# Patient Record
Sex: MALE | Race: WHITE | Employment: OTHER | URBAN - METROPOLITAN AREA
[De-identification: names, ages, dates, MRNs, and addresses within clinical notes are randomized per-mention and may not be internally consistent; named-entity substitution may affect disease eponyms.]

---

## 2017-03-10 ENCOUNTER — HOSPITAL ENCOUNTER (OUTPATIENT)
Dept: ENDOSCOPY | Age: 62
Discharge: HOME | End: 2017-03-10
Attending: INTERNAL MEDICINE | Admitting: INTERNAL MEDICINE

## 2017-03-10 VITALS
TEMPERATURE: 97.9 F | HEART RATE: 70 BPM | OXYGEN SATURATION: 100 % | HEIGHT: 73 IN | WEIGHT: 182 LBS | BODY MASS INDEX: 24.12 KG/M2 | RESPIRATION RATE: 20 BRPM

## 2017-03-10 LAB
GLUCOSE BLD-MCNC: 106 MG/DL (ref 70–99)
PERFORMED ON: ABNORMAL

## 2017-04-04 ENCOUNTER — HOSPITAL ENCOUNTER (OUTPATIENT)
Dept: ENDOSCOPY | Age: 62
Discharge: HOME | End: 2017-04-04
Attending: INTERNAL MEDICINE | Admitting: INTERNAL MEDICINE

## 2017-04-04 VITALS
HEIGHT: 73 IN | WEIGHT: 182 LBS | RESPIRATION RATE: 16 BRPM | OXYGEN SATURATION: 96 % | DIASTOLIC BLOOD PRESSURE: 71 MMHG | BODY MASS INDEX: 24.12 KG/M2 | SYSTOLIC BLOOD PRESSURE: 117 MMHG | HEART RATE: 68 BPM | TEMPERATURE: 97.7 F

## 2017-04-04 LAB
GLUCOSE BLD-MCNC: 113 MG/DL (ref 70–99)
PERFORMED ON: ABNORMAL

## 2017-04-04 ASSESSMENT — PAIN - FUNCTIONAL ASSESSMENT: PAIN_FUNCTIONAL_ASSESSMENT: 0-10

## 2018-01-24 ENCOUNTER — HOSPITAL ENCOUNTER (OUTPATIENT)
Dept: ENDOSCOPY | Age: 63
Discharge: HOME | End: 2018-01-24
Attending: INTERNAL MEDICINE | Admitting: INTERNAL MEDICINE

## 2018-01-24 VITALS
OXYGEN SATURATION: 97 % | HEART RATE: 71 BPM | SYSTOLIC BLOOD PRESSURE: 124 MMHG | TEMPERATURE: 97.5 F | DIASTOLIC BLOOD PRESSURE: 74 MMHG | RESPIRATION RATE: 18 BRPM

## 2018-01-24 LAB
GLUCOSE BLD-MCNC: 113 MG/DL (ref 70–99)
PERFORMED ON: ABNORMAL

## 2018-01-24 NOTE — PLAN OF CARE
ADMISSION PRE-PROCEDURE INTRA-PROCEDURE POST-PROCEDURE: RECOVERY/ DISCHARGE   ASSESSMENT &  EVALUATION CONSULTS [x] Verify patient identification, allergies, vital signs, NPO, IV, & SPO2  [x] Complete  the GURJIT SCORE  [x] Consent form to treat signed  [x] History and Physical [x] Reassess patient after pre- procedure medication given  [x] GI physician evaluates pt  [x] Verify patient's name, allergies [x] Continuous monotoring of vital  signs, SPO2, LOC  [x] Emotional status  [x] Patient comfort level [x] Total system admission assessment with appropriate intervention  [x] Pain evaluation and management  [x] Discharge assessment with appropriate intervention  [x] Compare with pre-procedure status  [x] Discharge by appropriate physician   DIAGNOSTIC / TESTS [x] Lab work ordered  [x] Obtain and attach lab work to patient's chart  [x] Report abnormals and F/U with physician [x] Assure needed test results are present [x] Diagnostic/therapeutic testing as indicated  [x] Obtained specimens sent to lab [x] Diagnostic testing as indicated  [x] Assess the gag reflex   MEDICATIONS [x] Conscious sedation medications  explained to patient  [x] Start IV per physician's order  and/or protocol  [x] Pre-procedure med. as ordered [x] Re-check IV access [x] Assist with administration of IV conscious sedation medication  [x] Start O2 per  nasal cannula, if needed   [x] IV fluids as indicated/ordered  [x] Administration of medications as ordered  [x] Medications as prescribed  [x] D/C O2 therapy as ordered   PROCEDURE/TREATMENT [x] Specific order by GI physician  [x] Specific procedure as scheduled  [x] Verify procedure as ordered [x] Time out/procedure verification checklist complete.  [x] EGD and related procedures  [x] Assist physician with the procedure [x] Treatment as indicated   NUTRITION / DIET [x] NPO after midnight, as ordered [x] Verify NPO status [x] IV fluids as support [x] Clear liquids and/or ice chips or special diet

## 2018-12-19 ENCOUNTER — HOSPITAL ENCOUNTER (OUTPATIENT)
Age: 63
Setting detail: OUTPATIENT SURGERY
Discharge: HOME | End: 2018-12-19
Attending: INTERNAL MEDICINE | Admitting: INTERNAL MEDICINE
Payer: COMMERCIAL

## 2018-12-19 VITALS
WEIGHT: 188 LBS | RESPIRATION RATE: 18 BRPM | OXYGEN SATURATION: 97 % | TEMPERATURE: 97.7 F | SYSTOLIC BLOOD PRESSURE: 100 MMHG | HEIGHT: 73 IN | HEART RATE: 67 BPM | DIASTOLIC BLOOD PRESSURE: 66 MMHG | BODY MASS INDEX: 24.92 KG/M2

## 2018-12-19 PROCEDURE — 7100000011 HC PHASE II RECOVERY - ADDTL 15 MIN: Performed by: INTERNAL MEDICINE

## 2018-12-19 PROCEDURE — 2709999900 HC NON-CHARGEABLE SUPPLY: Performed by: INTERNAL MEDICINE

## 2018-12-19 PROCEDURE — 99152 MOD SED SAME PHYS/QHP 5/>YRS: CPT | Performed by: INTERNAL MEDICINE

## 2018-12-19 PROCEDURE — 7100000010 HC PHASE II RECOVERY - FIRST 15 MIN: Performed by: INTERNAL MEDICINE

## 2018-12-19 PROCEDURE — 6370000000 HC RX 637 (ALT 250 FOR IP): Performed by: INTERNAL MEDICINE

## 2018-12-19 PROCEDURE — 6360000002 HC RX W HCPCS: Performed by: INTERNAL MEDICINE

## 2018-12-19 PROCEDURE — 88305 TISSUE EXAM BY PATHOLOGIST: CPT

## 2018-12-19 PROCEDURE — 3609012400 HC EGD TRANSORAL BIOPSY SINGLE/MULTIPLE: Performed by: INTERNAL MEDICINE

## 2018-12-19 RX ORDER — MIDAZOLAM HYDROCHLORIDE 1 MG/ML
INJECTION INTRAMUSCULAR; INTRAVENOUS PRN
Status: DISCONTINUED | OUTPATIENT
Start: 2018-12-19 | End: 2018-12-19 | Stop reason: HOSPADM

## 2018-12-19 RX ORDER — MEPERIDINE HYDROCHLORIDE 50 MG/ML
INJECTION INTRAMUSCULAR; INTRAVENOUS; SUBCUTANEOUS PRN
Status: DISCONTINUED | OUTPATIENT
Start: 2018-12-19 | End: 2018-12-19 | Stop reason: HOSPADM

## 2018-12-19 ASSESSMENT — PAIN - FUNCTIONAL ASSESSMENT
PAIN_FUNCTIONAL_ASSESSMENT: 0-10
PAIN_FUNCTIONAL_ASSESSMENT: 0-10

## 2018-12-19 ASSESSMENT — PAIN SCALES - GENERAL
PAINLEVEL_OUTOF10: 0

## 2019-05-08 ENCOUNTER — HOSPITAL ENCOUNTER (OUTPATIENT)
Age: 64
Setting detail: OUTPATIENT SURGERY
Discharge: HOME | End: 2019-05-08
Attending: INTERNAL MEDICINE | Admitting: INTERNAL MEDICINE
Payer: COMMERCIAL

## 2019-05-08 VITALS
HEIGHT: 73 IN | BODY MASS INDEX: 24.39 KG/M2 | RESPIRATION RATE: 16 BRPM | HEART RATE: 64 BPM | TEMPERATURE: 98 F | DIASTOLIC BLOOD PRESSURE: 63 MMHG | OXYGEN SATURATION: 94 % | SYSTOLIC BLOOD PRESSURE: 98 MMHG | WEIGHT: 184 LBS

## 2019-05-08 LAB
GLUCOSE BLD-MCNC: 112 MG/DL (ref 70–99)
PERFORMED ON: ABNORMAL

## 2019-05-08 PROCEDURE — 7100000011 HC PHASE II RECOVERY - ADDTL 15 MIN: Performed by: INTERNAL MEDICINE

## 2019-05-08 PROCEDURE — 2500000003 HC RX 250 WO HCPCS: Performed by: INTERNAL MEDICINE

## 2019-05-08 PROCEDURE — 3609009500 HC COLONOSCOPY DIAGNOSTIC OR SCREENING: Performed by: INTERNAL MEDICINE

## 2019-05-08 PROCEDURE — 6360000002 HC RX W HCPCS: Performed by: INTERNAL MEDICINE

## 2019-05-08 PROCEDURE — 7100000010 HC PHASE II RECOVERY - FIRST 15 MIN: Performed by: INTERNAL MEDICINE

## 2019-05-08 PROCEDURE — 99152 MOD SED SAME PHYS/QHP 5/>YRS: CPT | Performed by: INTERNAL MEDICINE

## 2019-05-08 RX ORDER — MEPERIDINE HYDROCHLORIDE 50 MG/ML
INJECTION INTRAMUSCULAR; INTRAVENOUS; SUBCUTANEOUS PRN
Status: DISCONTINUED | OUTPATIENT
Start: 2019-05-08 | End: 2019-05-08 | Stop reason: ALTCHOICE

## 2019-05-08 RX ORDER — MIDAZOLAM HYDROCHLORIDE 1 MG/ML
INJECTION INTRAMUSCULAR; INTRAVENOUS PRN
Status: DISCONTINUED | OUTPATIENT
Start: 2019-05-08 | End: 2019-05-08 | Stop reason: ALTCHOICE

## 2019-05-08 ASSESSMENT — PAIN - FUNCTIONAL ASSESSMENT
PAIN_FUNCTIONAL_ASSESSMENT: FACES
PAIN_FUNCTIONAL_ASSESSMENT: 0-10
PAIN_FUNCTIONAL_ASSESSMENT: FACES
PAIN_FUNCTIONAL_ASSESSMENT: FACES
PAIN_FUNCTIONAL_ASSESSMENT: 0-10

## 2019-05-08 NOTE — PROGRESS NOTES
Hermann Travis is a 60 y.o. male patient. No current facility-administered medications for this encounter. Allergies   Allergen Reactions    Latex Rash     Active Problems:    * No active hospital problems. *  Resolved Problems:    * No resolved hospital problems. *    Blood pressure (!) 133/91, pulse 71, temperature 98 °F (36.7 °C), temperature source Oral, resp. rate 16, height 6' 1\" (1.854 m), weight 184 lb (83.5 kg), SpO2 97 %. Subjective:  Symptoms:  Stable. (LOOSE STOOLS REPORTED AT TIMES). Diet:  Adequate intake. Activity level: Normal.    Pain:  He reports no pain. Objective:  General Appearance:  Comfortable. Vital signs: (most recent): Blood pressure (!) 133/91, pulse 71, temperature 98 °F (36.7 °C), temperature source Oral, resp. rate 16, height 6' 1\" (1.854 m), weight 184 lb (83.5 kg), SpO2 97 %. Vital signs are normal.    Output: Producing urine and producing stool. HEENT: Normal HEENT exam.    Lungs:  Normal effort and normal respiratory rate. Breath sounds clear to auscultation. Heart: Normal rate. Regular rhythm. S1 normal.    Abdomen: Abdomen is soft. Bowel sounds are normal.     Extremities: Normal range of motion. Pulses: Distal pulses are intact. Pupils:  Pupils are equal, round, and reactive to light. Skin:  Warm and dry.       Assessment & Plan    Stacy Bateman RN  5/8/2019

## 2019-05-08 NOTE — H&P
History and Physical / Pre-Sedation Assessment    Patient:  Felix Astudillo   :   1955     Intended Procedure:  colonoscopy    HPI: h/o polyps    Past Medical History:   has a past medical history of Branch esophagus, Diabetes mellitus ([facility] [address] Esophageal lesion, H/O removal of cyst, Hx of blood clots, Hyperlipidemia, Hypertension, Pneumonia, and Thyroid disease. Past Surgical History:   has a past surgical history that includes Colonoscopy; Endoscopy, colon, diagnostic (13); fracture surgery (Left, spring 2006); and Upper gastrointestinal endoscopy (N/A, 2018). Medications:  Prior to Admission medications    Medication Sig Start Date End Date Taking? Authorizing Provider   Multiple Vitamins-Minerals (THERAPEUTIC MULTIVITAMIN-MINERALS) tablet Take 1 tablet by mouth daily. Yes Historical Provider, MD   sitaGLIPtin-metFORMIN (JANUMET XR)  MG TB24 tablet Take 1 tablet by mouth 2 times daily. Yes Historical Provider, MD   ezetimibe-simvastatin (VYTORIN) 10-40 MG per tablet Take 1 tablet by mouth nightly. Yes Historical Provider, MD   Levothyroxine Sodium (TIROSINT) 100 MCG CAPS Take 1 tablet by mouth Daily. Yes Historical Provider, MD   losartan-hydrochlorothiazide (HYZAAR) 100-25 MG per tablet Take 1 tablet by mouth daily. Yes Historical Provider, MD       Family History:  family history is not on file. Social History:   reports that he has never smoked. He has never used smokeless tobacco. He reports that he drinks alcohol. He reports that he does not use drugs. Allergies:  Latex    Nurses notes reviewed and agreed.   Medications reviewed    Physical Exam:  Vital Signs: BP (!) 98/58   Pulse 71   Temp 98 °F (36.7 °C) (Oral)   Resp 16   Ht 6' 1\" (1.854 m)   Wt 184 lb (83.5 kg)   SpO2 95%   BMI 24.28 kg/m²    Pulmonary:Normal  Cardiac:Normal  Abdomen:Normal    Pre-Procedure Assessment / Plan:  ASA 2 - Patient with mild systemic disease with no functional

## 2019-12-19 ENCOUNTER — ANESTHESIA EVENT (OUTPATIENT)
Dept: ENDOSCOPY | Age: 64
End: 2019-12-19
Payer: COMMERCIAL

## 2019-12-19 RX ORDER — SODIUM CHLORIDE 0.9 % (FLUSH) 0.9 %
10 SYRINGE (ML) INJECTION PRN
Status: CANCELLED | OUTPATIENT
Start: 2019-12-19

## 2019-12-19 RX ORDER — SODIUM CHLORIDE, SODIUM LACTATE, POTASSIUM CHLORIDE, CALCIUM CHLORIDE 600; 310; 30; 20 MG/100ML; MG/100ML; MG/100ML; MG/100ML
INJECTION, SOLUTION INTRAVENOUS CONTINUOUS
Status: CANCELLED | OUTPATIENT
Start: 2019-12-19

## 2019-12-19 RX ORDER — LIDOCAINE HYDROCHLORIDE 10 MG/ML
1 INJECTION, SOLUTION EPIDURAL; INFILTRATION; INTRACAUDAL; PERINEURAL
Status: CANCELLED | OUTPATIENT
Start: 2019-12-19 | End: 2019-12-19

## 2019-12-19 RX ORDER — SODIUM CHLORIDE 0.9 % (FLUSH) 0.9 %
10 SYRINGE (ML) INJECTION EVERY 12 HOURS SCHEDULED
Status: CANCELLED | OUTPATIENT
Start: 2019-12-19

## 2019-12-20 ENCOUNTER — ANESTHESIA (OUTPATIENT)
Dept: ENDOSCOPY | Age: 64
End: 2019-12-20
Payer: COMMERCIAL

## 2019-12-20 ENCOUNTER — HOSPITAL ENCOUNTER (OUTPATIENT)
Age: 64
Setting detail: OUTPATIENT SURGERY
Discharge: HOME | End: 2019-12-20
Attending: INTERNAL MEDICINE | Admitting: INTERNAL MEDICINE
Payer: COMMERCIAL

## 2019-12-20 VITALS — SYSTOLIC BLOOD PRESSURE: 106 MMHG | DIASTOLIC BLOOD PRESSURE: 64 MMHG | OXYGEN SATURATION: 100 %

## 2019-12-20 VITALS
OXYGEN SATURATION: 97 % | WEIGHT: 188 LBS | HEART RATE: 61 BPM | DIASTOLIC BLOOD PRESSURE: 75 MMHG | BODY MASS INDEX: 24.92 KG/M2 | RESPIRATION RATE: 16 BRPM | TEMPERATURE: 97.2 F | SYSTOLIC BLOOD PRESSURE: 131 MMHG | HEIGHT: 73 IN

## 2019-12-20 LAB
GLUCOSE BLD-MCNC: 103 MG/DL (ref 70–99)
PERFORMED ON: ABNORMAL

## 2019-12-20 PROCEDURE — 2709999900 HC NON-CHARGEABLE SUPPLY: Performed by: INTERNAL MEDICINE

## 2019-12-20 PROCEDURE — 88305 TISSUE EXAM BY PATHOLOGIST: CPT

## 2019-12-20 PROCEDURE — 3700000000 HC ANESTHESIA ATTENDED CARE: Performed by: INTERNAL MEDICINE

## 2019-12-20 PROCEDURE — 2580000003 HC RX 258: Performed by: NURSE ANESTHETIST, CERTIFIED REGISTERED

## 2019-12-20 PROCEDURE — 7100000011 HC PHASE II RECOVERY - ADDTL 15 MIN: Performed by: INTERNAL MEDICINE

## 2019-12-20 PROCEDURE — 3700000001 HC ADD 15 MINUTES (ANESTHESIA): Performed by: INTERNAL MEDICINE

## 2019-12-20 PROCEDURE — 7100000010 HC PHASE II RECOVERY - FIRST 15 MIN: Performed by: INTERNAL MEDICINE

## 2019-12-20 PROCEDURE — 6360000002 HC RX W HCPCS: Performed by: NURSE ANESTHETIST, CERTIFIED REGISTERED

## 2019-12-20 PROCEDURE — 3609012400 HC EGD TRANSORAL BIOPSY SINGLE/MULTIPLE: Performed by: INTERNAL MEDICINE

## 2019-12-20 RX ORDER — SODIUM CHLORIDE, SODIUM LACTATE, POTASSIUM CHLORIDE, CALCIUM CHLORIDE 600; 310; 30; 20 MG/100ML; MG/100ML; MG/100ML; MG/100ML
INJECTION, SOLUTION INTRAVENOUS CONTINUOUS PRN
Status: DISCONTINUED | OUTPATIENT
Start: 2019-12-20 | End: 2019-12-20 | Stop reason: SDUPTHER

## 2019-12-20 RX ORDER — LIDOCAINE HYDROCHLORIDE 20 MG/ML
INJECTION, SOLUTION INTRAVENOUS PRN
Status: DISCONTINUED | OUTPATIENT
Start: 2019-12-20 | End: 2019-12-20 | Stop reason: SDUPTHER

## 2019-12-20 RX ORDER — PROPOFOL 10 MG/ML
INJECTION, EMULSION INTRAVENOUS PRN
Status: DISCONTINUED | OUTPATIENT
Start: 2019-12-20 | End: 2019-12-20 | Stop reason: SDUPTHER

## 2019-12-20 RX ADMIN — SODIUM CHLORIDE, SODIUM LACTATE, POTASSIUM CHLORIDE, AND CALCIUM CHLORIDE: 600; 310; 30; 20 INJECTION, SOLUTION INTRAVENOUS at 07:18

## 2019-12-20 RX ADMIN — PROPOFOL 150 MG: 10 INJECTION, EMULSION INTRAVENOUS at 07:51

## 2019-12-20 RX ADMIN — LIDOCAINE HYDROCHLORIDE 100 MG: 20 INJECTION, SOLUTION INTRAVENOUS at 07:49

## 2019-12-20 ASSESSMENT — PULMONARY FUNCTION TESTS
PIF_VALUE: 0
PIF_VALUE: 1
PIF_VALUE: 0

## 2019-12-20 ASSESSMENT — LIFESTYLE VARIABLES: SMOKING_STATUS: 0

## 2019-12-20 ASSESSMENT — PAIN SCALES - GENERAL
PAINLEVEL_OUTOF10: 0
PAINLEVEL_OUTOF10: 0

## 2019-12-20 ASSESSMENT — PAIN - FUNCTIONAL ASSESSMENT
PAIN_FUNCTIONAL_ASSESSMENT: 0-10
PAIN_FUNCTIONAL_ASSESSMENT: 0-10

## 2019-12-20 ASSESSMENT — PAIN SCALES - WONG BAKER: WONGBAKER_NUMERICALRESPONSE: 0

## 2020-12-17 ENCOUNTER — OFFICE VISIT (OUTPATIENT)
Dept: PRIMARY CARE CLINIC | Age: 65
End: 2020-12-17
Payer: COMMERCIAL

## 2020-12-17 PROCEDURE — 99211 OFF/OP EST MAY X REQ PHY/QHP: CPT | Performed by: NURSE PRACTITIONER

## 2020-12-17 PROCEDURE — G8421 BMI NOT CALCULATED: HCPCS | Performed by: NURSE PRACTITIONER

## 2020-12-17 PROCEDURE — G8428 CUR MEDS NOT DOCUMENT: HCPCS | Performed by: NURSE PRACTITIONER

## 2020-12-18 LAB — SARS-COV-2: NOT DETECTED

## 2020-12-22 ENCOUNTER — ANESTHESIA EVENT (OUTPATIENT)
Dept: ENDOSCOPY | Age: 65
End: 2020-12-22
Payer: COMMERCIAL

## 2020-12-23 ENCOUNTER — HOSPITAL ENCOUNTER (OUTPATIENT)
Age: 65
Setting detail: OUTPATIENT SURGERY
Discharge: HOME | End: 2020-12-23
Attending: INTERNAL MEDICINE | Admitting: INTERNAL MEDICINE
Payer: COMMERCIAL

## 2020-12-23 ENCOUNTER — ANESTHESIA (OUTPATIENT)
Dept: ENDOSCOPY | Age: 65
End: 2020-12-23
Payer: COMMERCIAL

## 2020-12-23 VITALS
RESPIRATION RATE: 18 BRPM | BODY MASS INDEX: 24.25 KG/M2 | WEIGHT: 183 LBS | SYSTOLIC BLOOD PRESSURE: 120 MMHG | OXYGEN SATURATION: 98 % | HEART RATE: 68 BPM | TEMPERATURE: 96.4 F | HEIGHT: 73 IN | DIASTOLIC BLOOD PRESSURE: 78 MMHG

## 2020-12-23 VITALS
SYSTOLIC BLOOD PRESSURE: 109 MMHG | TEMPERATURE: 96.1 F | RESPIRATION RATE: 19 BRPM | OXYGEN SATURATION: 100 % | DIASTOLIC BLOOD PRESSURE: 67 MMHG

## 2020-12-23 LAB
GLUCOSE BLD-MCNC: 124 MG/DL (ref 70–99)
PERFORMED ON: ABNORMAL

## 2020-12-23 PROCEDURE — 2580000003 HC RX 258: Performed by: ANESTHESIOLOGY

## 2020-12-23 PROCEDURE — 3609012400 HC EGD TRANSORAL BIOPSY SINGLE/MULTIPLE: Performed by: INTERNAL MEDICINE

## 2020-12-23 PROCEDURE — 88312 SPECIAL STAINS GROUP 1: CPT

## 2020-12-23 PROCEDURE — 6360000002 HC RX W HCPCS: Performed by: NURSE ANESTHETIST, CERTIFIED REGISTERED

## 2020-12-23 PROCEDURE — 88305 TISSUE EXAM BY PATHOLOGIST: CPT

## 2020-12-23 PROCEDURE — 3700000001 HC ADD 15 MINUTES (ANESTHESIA): Performed by: INTERNAL MEDICINE

## 2020-12-23 PROCEDURE — 3700000000 HC ANESTHESIA ATTENDED CARE: Performed by: INTERNAL MEDICINE

## 2020-12-23 PROCEDURE — 7100000011 HC PHASE II RECOVERY - ADDTL 15 MIN: Performed by: INTERNAL MEDICINE

## 2020-12-23 PROCEDURE — 2709999900 HC NON-CHARGEABLE SUPPLY: Performed by: INTERNAL MEDICINE

## 2020-12-23 PROCEDURE — 6370000000 HC RX 637 (ALT 250 FOR IP): Performed by: INTERNAL MEDICINE

## 2020-12-23 PROCEDURE — 7100000010 HC PHASE II RECOVERY - FIRST 15 MIN: Performed by: INTERNAL MEDICINE

## 2020-12-23 RX ORDER — PROPOFOL 10 MG/ML
INJECTION, EMULSION INTRAVENOUS PRN
Status: DISCONTINUED | OUTPATIENT
Start: 2020-12-23 | End: 2020-12-23 | Stop reason: SDUPTHER

## 2020-12-23 RX ORDER — SODIUM CHLORIDE, SODIUM LACTATE, POTASSIUM CHLORIDE, CALCIUM CHLORIDE 600; 310; 30; 20 MG/100ML; MG/100ML; MG/100ML; MG/100ML
INJECTION, SOLUTION INTRAVENOUS CONTINUOUS
Status: DISCONTINUED | OUTPATIENT
Start: 2020-12-23 | End: 2020-12-23 | Stop reason: HOSPADM

## 2020-12-23 RX ORDER — LIDOCAINE HYDROCHLORIDE 20 MG/ML
INJECTION, SOLUTION INTRAVENOUS PRN
Status: DISCONTINUED | OUTPATIENT
Start: 2020-12-23 | End: 2020-12-23 | Stop reason: SDUPTHER

## 2020-12-23 RX ADMIN — PROPOFOL 40 MG: 10 INJECTION, EMULSION INTRAVENOUS at 08:49

## 2020-12-23 RX ADMIN — LIDOCAINE HYDROCHLORIDE 40 MG: 20 INJECTION, SOLUTION INTRAVENOUS at 08:48

## 2020-12-23 RX ADMIN — PROPOFOL 40 MG: 10 INJECTION, EMULSION INTRAVENOUS at 08:53

## 2020-12-23 RX ADMIN — SODIUM CHLORIDE, POTASSIUM CHLORIDE, SODIUM LACTATE AND CALCIUM CHLORIDE: 600; 310; 30; 20 INJECTION, SOLUTION INTRAVENOUS at 08:43

## 2020-12-23 RX ADMIN — SODIUM CHLORIDE, POTASSIUM CHLORIDE, SODIUM LACTATE AND CALCIUM CHLORIDE: 600; 310; 30; 20 INJECTION, SOLUTION INTRAVENOUS at 07:20

## 2020-12-23 RX ADMIN — PROPOFOL 40 MG: 10 INJECTION, EMULSION INTRAVENOUS at 08:50

## 2020-12-23 RX ADMIN — LIDOCAINE HYDROCHLORIDE 20 MG: 20 INJECTION, SOLUTION INTRAVENOUS at 08:55

## 2020-12-23 RX ADMIN — PROPOFOL 40 MG: 10 INJECTION, EMULSION INTRAVENOUS at 08:56

## 2020-12-23 ASSESSMENT — PULMONARY FUNCTION TESTS
PIF_VALUE: 1
PIF_VALUE: 0
PIF_VALUE: 1

## 2020-12-23 ASSESSMENT — PAIN SCALES - WONG BAKER: WONGBAKER_NUMERICALRESPONSE: 0

## 2020-12-23 ASSESSMENT — PAIN - FUNCTIONAL ASSESSMENT: PAIN_FUNCTIONAL_ASSESSMENT: 0-10

## 2020-12-23 NOTE — PROGRESS NOTES
Ambulatory Surgery/Procedure Discharge Note    Vitals:    12/23/20 0907   BP: 120/78   Pulse: 68   Resp: 18   Temp: 96.4 °F (35.8 °C)   SpO2: 98%       In: 200 [I.V.:200]  Out: -     Restroom use offered before discharge. Yes  Pt is toileted and no signs of distress with ambulation. Awaiting a call post procedure from Dr. Heather French. Discharge instructions were read and acknowledge understanding of same. Pain assessment:  none  Pain Level: 0        Patient discharged to home/self care.  Patient discharged by this nurse walk to waiting family/S.O.       12/23/2020 9:39 AM

## 2020-12-23 NOTE — ANESTHESIA PRE PROCEDURE
Department of Anesthesiology  Preprocedure Note       Name:  Lynda Rocha   Age:  63 y.o.  :  1955                                          MRN:  [id number]         Date:  2020      Surgeon: Iav Hahn):  Rae Enrique MD    Procedure: Procedure(s):  ESOPHAGOGASTRODUODENOSCOPY    Medications prior to admission:   Prior to Admission medications    Medication Sig Start Date End Date Taking? Authorizing Provider   Multiple Vitamins-Minerals (THERAPEUTIC MULTIVITAMIN-MINERALS) tablet Take 1 tablet by mouth daily. Yes Historical Provider, MD   sitaGLIPtin-metFORMIN (JANUMET XR)  MG TB24 tablet Take 1 tablet by mouth 2 times daily. Yes Historical Provider, MD   ezetimibe-simvastatin (VYTORIN) 10-40 MG per tablet Take 1 tablet by mouth nightly. Yes Historical Provider, MD   Levothyroxine Sodium (TIROSINT) 100 MCG CAPS Take 1 tablet by mouth Daily. Yes Historical Provider, MD   losartan-hydrochlorothiazide (HYZAAR) 100-25 MG per tablet Take 1 tablet by mouth daily. Yes Historical Provider, MD       Current medications:    Current Facility-Administered Medications   Medication Dose Route Frequency Provider Last Rate Last Admin    lactated ringers infusion   Intravenous Continuous Rom DO Beverley           Allergies: Allergies   Allergen Reactions    Latex Rash    Morphine Nausea And Vomiting       Problem List:  There is no problem list on file for this patient.       Past Medical History:        Diagnosis Date    Branch esophagus     Diabetes mellitus ([facility] [address]     Type 2    Esophageal lesion     H/O removal of cyst     shin    Hx of blood clots spring 2008    L ankle break; clots in leg and PE    Hyperlipidemia     Hypertension     Pneumonia 3 WEEKS AGO     FINISHED ANTIBIOTICS ALREADY    Thyroid disease        Past Surgical History:        Procedure Laterality Date    COLONOSCOPY      COLONOSCOPY N/A 2019 COLONOSCOPY performed by Jackolyn Romberg, MD at [address] , DIAGNOSTIC  11/07/13    EGD with Ablation    FRACTURE SURGERY Left spring 2006    ankle-leg clot & PE    UPPER GASTROINTESTINAL ENDOSCOPY N/A 12/19/2018    EGD BIOPSY  (Gastric for H. Pylori) performed by Jackolyn Romberg, MD at [address] [facility] N/A 12/20/2019    EGD BIOPSY performed by Jackolyn Romberg, MD at [address] History:    Social History     Tobacco Use    Smoking status: Never Smoker    Smokeless tobacco: Never Used   Substance Use Topics    Alcohol use: Yes     Comment: very occasional                                Counseling given: Not Answered      Vital Signs (Current): There were no vitals filed for this visit. BP Readings from Last 3 Encounters:   12/20/19 106/64   12/20/19 131/75   05/08/19 98/63       NPO Status: Time of last liquid consumption: 0500                        Time of last solid consumption: 1800                        Date of last liquid consumption: 12/23/20                        Date of last solid food consumption: 12/22/20    BMI:   Wt Readings from Last 3 Encounters:   12/20/19 188 lb (85.3 kg)   05/08/19 184 lb (83.5 kg)   12/19/18 188 lb (85.3 kg)     There is no height or weight on file to calculate BMI.    CBC: No results found for: WBC, RBC, HGB, HCT, MCV, RDW, PLT    CMP:   Lab Results   Component Value Date     02/28/2014    K 4.3 02/28/2014     02/28/2014    CO2 29 02/28/2014    BUN 14 02/28/2014    CREATININE 0.97 02/28/2014    GFRAA >60 02/28/2014    LABGLOM >60 02/28/2014    GLUCOSE 108 02/28/2014    CALCIUM 9.7 02/28/2014       POC Tests: No results for input(s): POCGLU, POCNA, POCK, POCCL, POCBUN, POCHEMO, POCHCT in the last 72 hours.     Coags: No results found for: PROTIME, INR, APTT    HCG (If Applicable): No results found for: PREGTESTUR, PREGSERUM, HCG, HCGQUANT ABGs: No results found for: PHART, PO2ART, GDG5GJQ, PWF1ZNG, BEART, L0WKTHSW     Type & Screen (If Applicable):  No results found for: LABABO, LABRH    Drug/Infectious Status (If Applicable):  No results found for: HIV, HEPCAB    COVID-19 Screening (If Applicable):   Lab Results   Component Value Date    COVID19 Not Detected 12/17/2020         Anesthesia Evaluation  Patient summary reviewed  Airway: Mallampati: II  TM distance: >3 FB   Neck ROM: full  Mouth opening: > = 3 FB Dental: normal exam         Pulmonary:                              Cardiovascular:    (+) hypertension:,                   Neuro/Psych:               GI/Hepatic/Renal:            ROS comment: Branch esophagus . Endo/Other:    (+) Diabetes, hypothyroidism::., .                 Abdominal:           Vascular:                                        Anesthesia Plan      MAC     ASA 2       Induction: intravenous. Anesthetic plan and risks discussed with patient.                       Awilda Liriano MD   12/23/2020

## 2020-12-23 NOTE — ANESTHESIA POSTPROCEDURE EVALUATION
Department of Anesthesiology  Postprocedure Note    Patient: Leoncio Pelaez  MRN: [id number]  YOB: 1955  Date of evaluation: 12/23/2020  Time:  9:34 AM     Procedure Summary     Date: 12/23/20 Room / Location: [facility] / [facility]    Anesthesia Start: 0845 Anesthesia Stop: 9806    Procedure: EGD BIOPSY (N/A ) Diagnosis:       Heart burn      Gastroesophageal reflux disease, unspecified whether esophagitis present      Branch's esophagus without dysplasia      (Heart burn [R12] Gastroesophageal reflux disease, [K21.9], Branch's [K22.70])    Surgeons: Demetra Bruce MD Responsible Provider: Christen Lundy MD    Anesthesia Type: MAC ASA Status: 2          Anesthesia Type: MAC    Joe Phase I: Joe Score: 10    Joe Phase II: Joe Score: 10    Last vitals: Reviewed and per EMR flowsheets.        Anesthesia Post Evaluation    Patient participation: complete - patient participated  Level of consciousness: awake  Airway patency: patent  Nausea & Vomiting: no nausea and no vomiting  Complications: no  Cardiovascular status: hemodynamically stable  Respiratory status: acceptable  Hydration status: stable

## 2020-12-23 NOTE — H&P
History and Physical / Pre-Sedation Assessment    Patient:  Sonya Gonzalez   :   1955     Intended Procedure: egd    HPI: cornell esophagus    Past Medical History:   has a past medical history of Cornell esophagus, Diabetes mellitus ([facility] [address] Esophageal lesion, H/O removal of cyst, Hx of blood clots, Hyperlipidemia, Hypertension, Pneumonia, and Thyroid disease. Past Surgical History:   has a past surgical history that includes Colonoscopy; Endoscopy, colon, diagnostic (13); fracture surgery (Left, spring 2006); Upper gastrointestinal endoscopy (N/A, 2018); Colonoscopy (N/A, 2019); and Upper gastrointestinal endoscopy (N/A, 2019). Medications:  Prior to Admission medications    Medication Sig Start Date End Date Taking? Authorizing Provider   Multiple Vitamins-Minerals (THERAPEUTIC MULTIVITAMIN-MINERALS) tablet Take 1 tablet by mouth daily. Yes Historical Provider, MD   sitaGLIPtin-metFORMIN (JANUMET XR)  MG TB24 tablet Take 1 tablet by mouth 2 times daily. Yes Historical Provider, MD   ezetimibe-simvastatin (VYTORIN) 10-40 MG per tablet Take 1 tablet by mouth nightly. Yes Historical Provider, MD   Levothyroxine Sodium (TIROSINT) 100 MCG CAPS Take 1 tablet by mouth Daily. Yes Historical Provider, MD   losartan-hydrochlorothiazide (HYZAAR) 100-25 MG per tablet Take 1 tablet by mouth daily. Yes Historical Provider, MD       Family History:  family history is not on file. Social History:   reports that he has never smoked. He has never used smokeless tobacco. He reports current alcohol use. He reports that he does not use drugs. Allergies:  Latex and Morphine    ROS:  twelve point system review was unremarkable except for above noted history. Nurses notes reviewed and agreed.   Medications reviewed    Physical Exam: Vital Signs: /76   Pulse 60   Temp 96.7 °F (35.9 °C) (Temporal)   Resp 15   Ht 6' 1\" (1.854 m)   Wt 183 lb (83 kg)   SpO2 99%   BMI 24.14 kg/m²    Skin: normal  HEENT: normal  Neck: supple. No adenopathy. No thyromegaly. No JVD. Pulmonary:Normal  Cardiac:Normal  Abdomen:Normal  MS: normal  Neuro: normal  Ext: no edema. Pulses normal    Pre-Procedure Assessment / Plan:  ASA 2 - Patient with mild systemic disease with no functional limitations  Mallampati Airway Assessment:  Mallampati Class II - (soft palate, fauces & uvula are visible)  Level of Sedation Plan: Moderate sedation  Post Procedure plan: Return to same level of care    I assessed the patient and find that the patient is in satisfactory condition to proceed with the planned procedure and sedation plan. I have explained the risk, benefits, and alternatives to the procedure. The patient understands and agrees to proceed.   Faith Albright  8:47 AM 12/23/2020

## 2021-01-07 ENCOUNTER — OFFICE VISIT (OUTPATIENT)
Dept: PRIMARY CARE CLINIC | Age: 66
End: 2021-01-07
Payer: COMMERCIAL

## 2021-01-07 DIAGNOSIS — Z01.818 PREOP EXAMINATION: Primary | ICD-10-CM

## 2021-01-07 LAB — SARS-COV-2: NOT DETECTED

## 2021-01-07 PROCEDURE — G8420 CALC BMI NORM PARAMETERS: HCPCS | Performed by: NURSE PRACTITIONER

## 2021-01-07 PROCEDURE — 99211 OFF/OP EST MAY X REQ PHY/QHP: CPT | Performed by: NURSE PRACTITIONER

## 2021-01-07 PROCEDURE — G8428 CUR MEDS NOT DOCUMENT: HCPCS | Performed by: NURSE PRACTITIONER

## 2021-01-12 ENCOUNTER — ANESTHESIA EVENT (OUTPATIENT)
Dept: ENDOSCOPY | Age: 66
End: 2021-01-12
Payer: COMMERCIAL

## 2021-01-13 ENCOUNTER — HOSPITAL ENCOUNTER (OUTPATIENT)
Age: 66
Setting detail: OUTPATIENT SURGERY
Discharge: HOME | End: 2021-01-13
Attending: INTERNAL MEDICINE | Admitting: INTERNAL MEDICINE
Payer: COMMERCIAL

## 2021-01-13 ENCOUNTER — ANESTHESIA (OUTPATIENT)
Dept: ENDOSCOPY | Age: 66
End: 2021-01-13
Payer: COMMERCIAL

## 2021-01-13 VITALS
RESPIRATION RATE: 17 BRPM | DIASTOLIC BLOOD PRESSURE: 61 MMHG | WEIGHT: 183 LBS | HEART RATE: 63 BPM | SYSTOLIC BLOOD PRESSURE: 98 MMHG | TEMPERATURE: 96.9 F | OXYGEN SATURATION: 96 % | BODY MASS INDEX: 24.25 KG/M2 | HEIGHT: 73 IN

## 2021-01-13 VITALS
SYSTOLIC BLOOD PRESSURE: 89 MMHG | OXYGEN SATURATION: 98 % | DIASTOLIC BLOOD PRESSURE: 53 MMHG | RESPIRATION RATE: 15 BRPM

## 2021-01-13 DIAGNOSIS — K21.9 GASTROESOPHAGEAL REFLUX DISEASE, UNSPECIFIED WHETHER ESOPHAGITIS PRESENT: ICD-10-CM

## 2021-01-13 LAB
GLUCOSE BLD-MCNC: 119 MG/DL (ref 70–99)
PERFORMED ON: ABNORMAL

## 2021-01-13 PROCEDURE — 3700000000 HC ANESTHESIA ATTENDED CARE: Performed by: INTERNAL MEDICINE

## 2021-01-13 PROCEDURE — 7100000011 HC PHASE II RECOVERY - ADDTL 15 MIN: Performed by: INTERNAL MEDICINE

## 2021-01-13 PROCEDURE — 88305 TISSUE EXAM BY PATHOLOGIST: CPT

## 2021-01-13 PROCEDURE — 7100000010 HC PHASE II RECOVERY - FIRST 15 MIN: Performed by: INTERNAL MEDICINE

## 2021-01-13 PROCEDURE — 3609012400 HC EGD TRANSORAL BIOPSY SINGLE/MULTIPLE: Performed by: INTERNAL MEDICINE

## 2021-01-13 PROCEDURE — 6370000000 HC RX 637 (ALT 250 FOR IP): Performed by: INTERNAL MEDICINE

## 2021-01-13 PROCEDURE — 2709999900 HC NON-CHARGEABLE SUPPLY: Performed by: INTERNAL MEDICINE

## 2021-01-13 PROCEDURE — 6360000002 HC RX W HCPCS: Performed by: NURSE ANESTHETIST, CERTIFIED REGISTERED

## 2021-01-13 PROCEDURE — 2500000003 HC RX 250 WO HCPCS: Performed by: NURSE ANESTHETIST, CERTIFIED REGISTERED

## 2021-01-13 PROCEDURE — 2580000003 HC RX 258: Performed by: ANESTHESIOLOGY

## 2021-01-13 PROCEDURE — 3700000001 HC ADD 15 MINUTES (ANESTHESIA): Performed by: INTERNAL MEDICINE

## 2021-01-13 RX ORDER — PROPOFOL 10 MG/ML
INJECTION, EMULSION INTRAVENOUS PRN
Status: DISCONTINUED | OUTPATIENT
Start: 2021-01-13 | End: 2021-01-13 | Stop reason: SDUPTHER

## 2021-01-13 RX ORDER — LIDOCAINE HYDROCHLORIDE 20 MG/ML
INJECTION, SOLUTION EPIDURAL; INFILTRATION; INTRACAUDAL; PERINEURAL PRN
Status: DISCONTINUED | OUTPATIENT
Start: 2021-01-13 | End: 2021-01-13 | Stop reason: SDUPTHER

## 2021-01-13 RX ORDER — SODIUM CHLORIDE, SODIUM LACTATE, POTASSIUM CHLORIDE, CALCIUM CHLORIDE 600; 310; 30; 20 MG/100ML; MG/100ML; MG/100ML; MG/100ML
INJECTION, SOLUTION INTRAVENOUS CONTINUOUS
Status: DISCONTINUED | OUTPATIENT
Start: 2021-01-13 | End: 2021-01-13 | Stop reason: HOSPADM

## 2021-01-13 RX ADMIN — LIDOCAINE HYDROCHLORIDE 80 MG: 20 INJECTION, SOLUTION EPIDURAL; INFILTRATION; INTRACAUDAL; PERINEURAL at 10:52

## 2021-01-13 RX ADMIN — PROPOFOL 60 MG: 10 INJECTION, EMULSION INTRAVENOUS at 10:52

## 2021-01-13 RX ADMIN — PROPOFOL 60 MG: 10 INJECTION, EMULSION INTRAVENOUS at 11:01

## 2021-01-13 RX ADMIN — PROPOFOL 80 MG: 10 INJECTION, EMULSION INTRAVENOUS at 10:57

## 2021-01-13 RX ADMIN — SODIUM CHLORIDE, POTASSIUM CHLORIDE, SODIUM LACTATE AND CALCIUM CHLORIDE: 600; 310; 30; 20 INJECTION, SOLUTION INTRAVENOUS at 10:03

## 2021-01-13 ASSESSMENT — PULMONARY FUNCTION TESTS
PIF_VALUE: 0

## 2021-01-13 ASSESSMENT — LIFESTYLE VARIABLES: SMOKING_STATUS: 0

## 2021-01-13 ASSESSMENT — PAIN SCALES - GENERAL: PAINLEVEL_OUTOF10: 0

## 2021-01-13 NOTE — PROGRESS NOTES
Progress Note  Date:1/13/2021       Room:Endo Pool/NONE  Patient Name:Reji Gaffney     YOB: 1955     Age:65 y.o. Subjective    Subjective:  Symptoms:  Stable. (Follow up from EGD done in December 2020). Diet:  Adequate intake. Activity level: Normal.    Pain:  He reports no pain. Review of Systems  Objective         Vitals Last 24 Hours:  TEMPERATURE:  No data recorded  RESPIRATIONS RANGE: No data recorded  PULSE OXIMETRY RANGE: No data recorded  PULSE RANGE: No data recorded  BLOOD PRESSURE RANGE: No data recorded.  ; No data recorded. I/O (24Hr): No intake or output data in the 24 hours ending 01/13/21 0949  Objective:  General Appearance:  Comfortable, well-appearing, in no acute distress and not in pain. Vital signs: (most recent): Blood pressure (!) 150/79, pulse 79, temperature 97.8 °F (36.6 °C), temperature source Temporal, resp. rate 18, height 6' 1\" (1.854 m), weight 183 lb (83 kg), SpO2 97 %. Vital signs are normal.    Output: Producing urine and producing stool. Lungs:  Normal effort. Heart: Normal rate. Abdomen: Abdomen is soft. There is no abdominal tenderness. Extremities: Normal range of motion. Neurological: Patient is alert and oriented to person, place and time. Skin:  Warm and dry. Labs/Imaging/Diagnostics    Labs:  CBC:No results for input(s): WBC, RBC, HGB, HCT, MCV, RDW, PLT in the last 72 hours. CHEMISTRIES:No results for input(s): NA, K, CL, CO2, BUN, CREATININE, GLUCOSE, PHOS, MG in the last 72 hours. Invalid input(s): CA  PT/INR:No results for input(s): PROTIME, INR in the last 72 hours. APTT:No results for input(s): APTT in the last 72 hours. LIVER PROFILE:No results for input(s): AST, ALT, BILIDIR, BILITOT, ALKPHOS in the last 72 hours.     Imaging Last 24 Hours:  Egd    Result Date: 1/13/2021  No dictation     Assessment//Plan           Assessment & Plan Electronically signed by Tad Celeste RN on 1/13/21 at 9:49 AM EST

## 2021-01-13 NOTE — OP NOTE
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 Anibal Le, Pathology Department of [place] [address]; after discussion, we decided to proceed with another endoscopy  to obtain further biopsies. DESCRIPTION OF PROCEDURE:  With the patient in the left lateral position  and after IV Diprivan, the Olympus video endoscope was introduced into  the esophagus and advanced toward the GE junction. Numerous biopsies  were obtained from GE junction and sent to Pathology. The stomach was  carefully inspected, it was unremarkable. The duodenum was normal.   Small hiatus hernia was identified. No visible neoplasm was noted. Scope was then removed without complication. IMPRESSION:  1. Hiatus hernia. 2.  Status post RFA for Rbanch's.    ebl none    PLAN:  We will await the pathology findings. I have discussed the case  today again with Dr. Anibal Le and he will review biopsy slides and we  will further discuss subsequently.         Antonio Jiménez MD    D: 2021 11:31:26       T: 2021 12:52:50     [email or web address]  Job#: [id number]     Doc#: [id number]    CC:

## 2021-01-13 NOTE — ANESTHESIA PRE PROCEDURE
Department of Anesthesiology  Preprocedure Note       Name:  Mik Chi   Age:  63 y.o.  :  1955                                          MRN:  [id number]         Date:  2021      Surgeon: Naresh Hood):  Neda Bueno MD    Procedure: Procedure(s):  ESOPHAGOGASTRODUODENOSCOPY    Medications prior to admission:   Prior to Admission medications    Medication Sig Start Date End Date Taking? Authorizing Provider   Multiple Vitamins-Minerals (THERAPEUTIC MULTIVITAMIN-MINERALS) tablet Take 1 tablet by mouth daily. Historical Provider, MD   sitaGLIPtin-metFORMIN (JANUMET XR)  MG TB24 tablet Take 1 tablet by mouth 2 times daily. Historical Provider, MD   ezetimibe-simvastatin (VYTORIN) 10-40 MG per tablet Take 1 tablet by mouth nightly. Historical Provider, MD   Levothyroxine Sodium (TIROSINT) 100 MCG CAPS Take 1 tablet by mouth Daily. Historical Provider, MD   losartan-hydrochlorothiazide (HYZAAR) 100-25 MG per tablet Take 1 tablet by mouth daily. Historical Provider, MD       Current medications:    No current facility-administered medications for this visit. No current outpatient medications on file. Facility-Administered Medications Ordered in Other Visits   Medication Dose Route Frequency Provider Last Rate Last Admin    lactated ringers infusion   Intravenous Continuous Stephana Carolyn,  mL/hr at 21 1003 New Bag at 21 1003       Allergies: Allergies   Allergen Reactions    Latex Rash     Any latex / rubber products    Morphine Nausea And Vomiting       Problem List:  There is no problem list on file for this patient.       Past Medical History:        Diagnosis Date    Branch esophagus     Diabetes mellitus ([facility] [address]     Type 2    Esophageal lesion     H/O removal of cyst     shin    Hx of blood clots spring 2008    L ankle break; clots in leg and PE    Hyperlipidemia     Hypertension     Pneumonia 3 WEEKS AGO     FINISHED ANTIBIOTICS ALREADY    Thyroid disease        Past Surgical History:        Procedure Laterality Date    COLONOSCOPY      COLONOSCOPY N/A 5/8/2019    COLONOSCOPY performed by Efe Ray MD at [address] , DIAGNOSTIC  11/07/13    EGD with Ablation    FRACTURE SURGERY Left spring 2006    ankle-leg clot & PE    UPPER GASTROINTESTINAL ENDOSCOPY N/A 12/19/2018    EGD BIOPSY  (Gastric for H. Pylori) performed by Efe Ray MD at [address] [facility] 12/20/2019    EGD BIOPSY performed by Efe Ray MD at [address] [facility] N/A 12/23/2020    EGD BIOPSY performed by Efe Ray MD at [address] [facility] History:    Social History     Tobacco Use    Smoking status: Never Smoker    Smokeless tobacco: Never Used   Substance Use Topics    Alcohol use: Yes     Comment: very occasional                                Counseling given: Not Answered      Vital Signs (Current): There were no vitals filed for this visit.                                            BP Readings from Last 3 Encounters:   01/12/21 (!) 150/79   12/23/20 109/67   12/23/20 120/78       NPO Status:                                                                                 BMI:   Wt Readings from Last 3 Encounters:   01/12/21 183 lb (83 kg)   12/23/20 183 lb (83 kg)   12/20/19 188 lb (85.3 kg)     There is no height or weight on file to calculate BMI.    CBC: No results found for: WBC, RBC, HGB, HCT, MCV, RDW, PLT    CMP:   Lab Results   Component Value Date     02/28/2014    K 4.3 02/28/2014     02/28/2014    CO2 29 02/28/2014    BUN 14 02/28/2014    CREATININE 0.97 02/28/2014    GFRAA >60 02/28/2014    LABGLOM >60 02/28/2014    GLUCOSE 108 02/28/2014    CALCIUM 9.7 02/28/2014       POC Tests:   Recent Labs     01/13/21  1005   POCGLU 119*       Coags: No results found for: PROTIME, INR, APTT    HCG (If Applicable): No results found for: PREGTESTUR, PREGSERUM, HCG, HCGQUANT     ABGs: No results found for: PHART, PO2ART, IQB8QJH, LDM1VQU, BEART, I6UXOGYS     Type & Screen (If Applicable):  No results found for: LABABO, LABRH    Drug/Infectious Status (If Applicable):  No results found for: HIV, HEPCAB    COVID-19 Screening (If Applicable):   Lab Results   Component Value Date    COVID19 Not Detected 01/07/2021         Anesthesia Evaluation  Patient summary reviewed and Nursing notes reviewed no history of anesthetic complications:   Airway: Mallampati: II  TM distance: >3 FB   Neck ROM: full  Mouth opening: > = 3 FB Dental: normal exam         Pulmonary:       (-) not a current smoker                           Cardiovascular:    (+) hypertension:, hyperlipidemia                  Neuro/Psych:               GI/Hepatic/Renal:            ROS comment: Brnach esophagus . Endo/Other:    (+) DiabetesType II DM, , hypothyroidism::., .                 Abdominal:           Vascular:           ROS comment: H/o LE DVT. Anesthesia Plan      MAC     ASA 2       Induction: intravenous. Anesthetic plan and risks discussed with patient.                       Jack Rojo DO   1/13/2021

## 2021-01-13 NOTE — PROGRESS NOTES
Ambulatory Surgery/Procedure Discharge Note    Vitals:    01/13/21 1129   BP: 98/61   Pulse: 63   Resp: 17   Temp: 96.9 °F (36.1 °C)   SpO2: 96%       In: 200 [I.V.:200]  Out: -     Restroom use offered before discharge. Pt denies to use the restroom and either refuse fluids offered. Discharge instructions were read to pt and spouse and verbalize understanding. No nausea and headache reported. Pain assessment:  none  Pain Level: 0        Patient discharged to home/self care.  Patient discharged and walk the pt to the main hallway to waiting family/S.O.       1/13/2021 11:46 AM

## 2021-01-13 NOTE — ANESTHESIA POSTPROCEDURE EVALUATION
Department of Anesthesiology  Postprocedure Note    Patient: Manju Coleman  MRN: [id number]  YOB: 1955  Date of evaluation: 1/13/2021  Time:  11:56 AM     Procedure Summary     Date: 01/13/21 Room / Location: [place] [address] / [facility]    Anesthesia Start: 8794 Anesthesia Stop: 1104    Procedure: EGD BIOPSY (N/A ) Diagnosis:       Gastroesophageal reflux disease, unspecified whether esophagitis present      (Gastroesophageal reflux disease [K21.9])    Surgeons: Almas Denise MD Responsible Provider: Jaspal Tatum DO    Anesthesia Type: MAC ASA Status: 2          Anesthesia Type: MAC    Joe Phase I: Joe Score: 10    Joe Phase II: Joe Score: 10    Last vitals: Reviewed and per EMR flowsheets.        Anesthesia Post Evaluation    Patient location during evaluation: PACU  Patient participation: complete - patient participated  Level of consciousness: awake and alert  Airway patency: patent  Nausea & Vomiting: no nausea and no vomiting  Cardiovascular status: blood pressure returned to baseline  Respiratory status: acceptable  Hydration status: euvolemic

## 2021-01-13 NOTE — H&P
History and Physical / Pre-Sedation Assessment    Patient:  Ronnell Hahn   :   1955     Intended Procedure:  egd    HPI: low grade dysplasia in las biopsies of distal esophagus in the columnar epithelium. Must be re checked and obtain further biopsies     Past Medical History:   has a past medical history of Branch esophagus, Diabetes mellitus ([facility] [address] Esophageal lesion, H/O removal of cyst, Hx of blood clots, Hyperlipidemia, Hypertension, Pneumonia, and Thyroid disease. Past Surgical History:   has a past surgical history that includes Colonoscopy; Endoscopy, colon, diagnostic (13); fracture surgery (Left, spring 2006); Upper gastrointestinal endoscopy (N/A, 2018); Colonoscopy (N/A, 2019); Upper gastrointestinal endoscopy (N/A, 2019); and Upper gastrointestinal endoscopy (N/A, 2020). Medications:  Prior to Admission medications    Medication Sig Start Date End Date Taking? Authorizing Provider   Multiple Vitamins-Minerals (THERAPEUTIC MULTIVITAMIN-MINERALS) tablet Take 1 tablet by mouth daily. Yes Historical Provider, MD   sitaGLIPtin-metFORMIN (JANUMET XR)  MG TB24 tablet Take 1 tablet by mouth 2 times daily. Yes Historical Provider, MD   ezetimibe-simvastatin (VYTORIN) 10-40 MG per tablet Take 1 tablet by mouth nightly. Yes Historical Provider, MD   Levothyroxine Sodium (TIROSINT) 100 MCG CAPS Take 1 tablet by mouth Daily. Yes Historical Provider, MD   losartan-hydrochlorothiazide (HYZAAR) 100-25 MG per tablet Take 1 tablet by mouth daily. Yes Historical Provider, MD       Family History:  family history is not on file. Social History:   reports that he has never smoked. He has never used smokeless tobacco. He reports current alcohol use. He reports that he does not use drugs. Allergies:  Latex and Morphine    ROS:  twelve point system review was unremarkable except for above noted history. Nurses notes reviewed and agreed. Medications reviewed    Physical Exam:  Vital Signs: Ht 6' 1\" (1.854 m)   Wt 183 lb (83 kg)   BMI 24.14 kg/m²    Skin: normal  HEENT: normal  Neck: supple. No adenopathy. No thyromegaly. No JVD. Pulmonary:Normal  Cardiac:Normal  Abdomen:Normal  MS: normal  Neuro: normal  Ext: no edema. Pulses normal    Pre-Procedure Assessment / Plan:  ASA 2 - Patient with mild systemic disease with no functional limitations  Mallampati Airway Assessment:  Mallampati Class II - (soft palate, fauces & uvula are visible)  Level of Sedation Plan: Moderate sedation  Post Procedure plan: Return to same level of care    I assessed the patient and find that the patient is in satisfactory condition to proceed with the planned procedure and sedation plan. I have explained the risk, benefits, and alternatives to the procedure. The patient understands and agrees to proceed.   Ben Funez  9:06 AM 1/13/2021

## 2021-03-30 NOTE — PROGRESS NOTES
ENDOSCOPY PREOP PHONE INTERVIEW  INSTRUCTIONS:   Covid test was . Please continue to quarantine until your procedure    All patients having a procedure with sedation / anesthesia are required to be Covid tested. You will need to quarantine from the time you are tested until your procedure. Where:   Date tested: INSTRUCTED TO GO TO [facility] 3/31 OR 4/1     Follow all instructions / preps given to you from your doctor's office. If you have not received these instructions yet, please call the office immediately.  Enter the MAIN entrance located on [employer] and report to the desk on left side of the lobby. Arrival Time: 0600 (0630 PER ATTAR ) for your procedure scheduled at: 0730   Bring your insurance & photo ID with you.  Dress comfortably. No lotion, powders or jewelry   Bring an accurate list of your medications with doses/ frequency with you day of the procedure, including over the counter medications. If you are taking blood thinners, ASA or diabetic medication, make sure to call Dr. Enedelia Montes  or your PCP for instructions prior to your procedure.  Arrange for someone to be with you and sign you out & drive you home after your procedure.  We are allowing 1 visitor with you in the hospital.        If you have further questions, you may contact your Endoscopist's office or Pre Admission Testing staff at [phone]  Meera Middleton. 3/30/2021 .1:27 PM

## 2021-04-01 ENCOUNTER — NURSE ONLY (OUTPATIENT)
Dept: PRIMARY CARE CLINIC | Age: 66
End: 2021-04-01
Payer: COMMERCIAL

## 2021-04-01 DIAGNOSIS — Z01.818 PREOP EXAMINATION: Primary | ICD-10-CM

## 2021-04-01 LAB — SARS-COV-2: NOT DETECTED

## 2021-04-01 PROCEDURE — 99211 OFF/OP EST MAY X REQ PHY/QHP: CPT | Performed by: NURSE PRACTITIONER

## 2021-04-05 ENCOUNTER — ANESTHESIA EVENT (OUTPATIENT)
Dept: ENDOSCOPY | Age: 66
End: 2021-04-05
Payer: COMMERCIAL

## 2021-04-06 ENCOUNTER — HOSPITAL ENCOUNTER (OUTPATIENT)
Age: 66
Setting detail: OUTPATIENT SURGERY
Discharge: HOME | End: 2021-04-06
Attending: INTERNAL MEDICINE | Admitting: INTERNAL MEDICINE
Payer: COMMERCIAL

## 2021-04-06 ENCOUNTER — ANESTHESIA (OUTPATIENT)
Dept: ENDOSCOPY | Age: 66
End: 2021-04-06
Payer: COMMERCIAL

## 2021-04-06 VITALS
HEART RATE: 62 BPM | SYSTOLIC BLOOD PRESSURE: 103 MMHG | OXYGEN SATURATION: 98 % | BODY MASS INDEX: 24.25 KG/M2 | WEIGHT: 183 LBS | HEIGHT: 73 IN | DIASTOLIC BLOOD PRESSURE: 71 MMHG | TEMPERATURE: 96.6 F | RESPIRATION RATE: 16 BRPM

## 2021-04-06 VITALS — DIASTOLIC BLOOD PRESSURE: 53 MMHG | OXYGEN SATURATION: 100 % | SYSTOLIC BLOOD PRESSURE: 89 MMHG

## 2021-04-06 DIAGNOSIS — R10.13 DYSPEPSIA: ICD-10-CM

## 2021-04-06 LAB
GLUCOSE BLD-MCNC: 111 MG/DL (ref 70–99)
PERFORMED ON: ABNORMAL

## 2021-04-06 PROCEDURE — 7100000011 HC PHASE II RECOVERY - ADDTL 15 MIN: Performed by: INTERNAL MEDICINE

## 2021-04-06 PROCEDURE — 3700000001 HC ADD 15 MINUTES (ANESTHESIA): Performed by: INTERNAL MEDICINE

## 2021-04-06 PROCEDURE — 3609012400 HC EGD TRANSORAL BIOPSY SINGLE/MULTIPLE: Performed by: INTERNAL MEDICINE

## 2021-04-06 PROCEDURE — 2709999900 HC NON-CHARGEABLE SUPPLY: Performed by: INTERNAL MEDICINE

## 2021-04-06 PROCEDURE — 88305 TISSUE EXAM BY PATHOLOGIST: CPT

## 2021-04-06 PROCEDURE — 6360000002 HC RX W HCPCS: Performed by: NURSE ANESTHETIST, CERTIFIED REGISTERED

## 2021-04-06 PROCEDURE — 2580000003 HC RX 258: Performed by: ANESTHESIOLOGY

## 2021-04-06 PROCEDURE — 3700000000 HC ANESTHESIA ATTENDED CARE: Performed by: INTERNAL MEDICINE

## 2021-04-06 PROCEDURE — 7100000010 HC PHASE II RECOVERY - FIRST 15 MIN: Performed by: INTERNAL MEDICINE

## 2021-04-06 RX ORDER — PROPOFOL 10 MG/ML
INJECTION, EMULSION INTRAVENOUS PRN
Status: DISCONTINUED | OUTPATIENT
Start: 2021-04-06 | End: 2021-04-06 | Stop reason: SDUPTHER

## 2021-04-06 RX ORDER — SODIUM CHLORIDE 0.9 % (FLUSH) 0.9 %
10 SYRINGE (ML) INJECTION EVERY 12 HOURS SCHEDULED
Status: DISCONTINUED | OUTPATIENT
Start: 2021-04-06 | End: 2021-04-06 | Stop reason: HOSPADM

## 2021-04-06 RX ORDER — SODIUM CHLORIDE 0.9 % (FLUSH) 0.9 %
10 SYRINGE (ML) INJECTION PRN
Status: DISCONTINUED | OUTPATIENT
Start: 2021-04-06 | End: 2021-04-06 | Stop reason: HOSPADM

## 2021-04-06 RX ORDER — LIDOCAINE HYDROCHLORIDE 20 MG/ML
INJECTION, SOLUTION INTRAVENOUS PRN
Status: DISCONTINUED | OUTPATIENT
Start: 2021-04-06 | End: 2021-04-06 | Stop reason: SDUPTHER

## 2021-04-06 RX ORDER — SODIUM CHLORIDE, SODIUM LACTATE, POTASSIUM CHLORIDE, CALCIUM CHLORIDE 600; 310; 30; 20 MG/100ML; MG/100ML; MG/100ML; MG/100ML
INJECTION, SOLUTION INTRAVENOUS CONTINUOUS
Status: DISCONTINUED | OUTPATIENT
Start: 2021-04-06 | End: 2021-04-06 | Stop reason: HOSPADM

## 2021-04-06 RX ORDER — LEVOTHYROXINE SODIUM 0.12 MG/1
125 TABLET ORAL DAILY
COMMUNITY

## 2021-04-06 RX ORDER — SODIUM CHLORIDE 9 MG/ML
INJECTION, SOLUTION INTRAVENOUS CONTINUOUS
Status: DISCONTINUED | OUTPATIENT
Start: 2021-04-06 | End: 2021-04-06 | Stop reason: HOSPADM

## 2021-04-06 RX ADMIN — PROPOFOL 50 MG: 10 INJECTION, EMULSION INTRAVENOUS at 08:05

## 2021-04-06 RX ADMIN — PROPOFOL 50 MG: 10 INJECTION, EMULSION INTRAVENOUS at 08:07

## 2021-04-06 RX ADMIN — PROPOFOL 70 MG: 10 INJECTION, EMULSION INTRAVENOUS at 07:58

## 2021-04-06 RX ADMIN — PROPOFOL 50 MG: 10 INJECTION, EMULSION INTRAVENOUS at 08:00

## 2021-04-06 RX ADMIN — PROPOFOL 50 MG: 10 INJECTION, EMULSION INTRAVENOUS at 08:04

## 2021-04-06 RX ADMIN — PROPOFOL 80 MG: 10 INJECTION, EMULSION INTRAVENOUS at 08:02

## 2021-04-06 RX ADMIN — SODIUM CHLORIDE, POTASSIUM CHLORIDE, SODIUM LACTATE AND CALCIUM CHLORIDE: 600; 310; 30; 20 INJECTION, SOLUTION INTRAVENOUS at 07:03

## 2021-04-06 RX ADMIN — LIDOCAINE HYDROCHLORIDE 100 MG: 20 INJECTION, SOLUTION INTRAVENOUS at 07:58

## 2021-04-06 ASSESSMENT — PULMONARY FUNCTION TESTS
PIF_VALUE: 1
PIF_VALUE: 0
PIF_VALUE: 1
PIF_VALUE: 0
PIF_VALUE: 1
PIF_VALUE: 0
PIF_VALUE: 1

## 2021-04-06 ASSESSMENT — PAIN - FUNCTIONAL ASSESSMENT: PAIN_FUNCTIONAL_ASSESSMENT: 0-10

## 2021-04-06 ASSESSMENT — LIFESTYLE VARIABLES: SMOKING_STATUS: 0

## 2021-04-06 NOTE — ANESTHESIA POSTPROCEDURE EVALUATION
Department of Anesthesiology  Postprocedure Note    Patient: Mame Reyna  MRN: [id number]  YOB: 1955  Date of evaluation: 4/6/2021  Time:  9:24 AM     Procedure Summary     Date: 04/06/21 Room / Location: [place] [address] / [facility]    Anesthesia Start: 6737 Anesthesia Stop: 8891    Procedure: EGD BIOPSY (N/A ) Diagnosis:       Dyspepsia      (Dyspepsia [R10.13])    Surgeons: Shelby Kilpatrick MD Responsible Provider: Jorge Macdonald MD    Anesthesia Type: MAC ASA Status: 2          Anesthesia Type: MAC    Joe Phase I: Joe Score: 10    Joe Phase II: Joe Score: 9    Last vitals: Reviewed and per EMR flowsheets.        Anesthesia Post Evaluation    Patient location during evaluation: bedside  Patient participation: complete - patient participated  Level of consciousness: awake  Pain score: 0  Airway patency: patent  Nausea & Vomiting: no nausea and no vomiting  Complications: no  Cardiovascular status: hemodynamically stable  Respiratory status: acceptable  Hydration status: euvolemic

## 2021-04-06 NOTE — ANESTHESIA PRE PROCEDURE
Esophageal lesion     H/O removal of cyst 2004    shin    Hx of blood clots spring 2008    L ankle break; clots in leg and PE    Hyperlipidemia     Hypertension     Pneumonia 3 WEEKS AGO     FINISHED ANTIBIOTICS ALREADY    Thyroid disease        Past Surgical History:        Procedure Laterality Date    COLONOSCOPY      COLONOSCOPY N/A 5/8/2019    COLONOSCOPY performed by To Enciso MD at [address], DIAGNOSTIC  11/07/13    EGD with Ablation    FRACTURE SURGERY Left spring 2006    ankle-leg clot & PE    UPPER GASTROINTESTINAL ENDOSCOPY N/A 12/19/2018    EGD BIOPSY  (Gastric for H. Pylori) performed by To Enciso MD at [address] [facility] 12/20/2019    EGD BIOPSY performed by To Enciso MD at [address] [facility] 12/23/2020    EGD BIOPSY performed by To Enciso MD at [address] [facility] N/A 1/13/2021    EGD BIOPSY performed by To Enciso MD at [address] History:    Social History     Tobacco Use    Smoking status: Never Smoker    Smokeless tobacco: Never Used   Substance Use Topics    Alcohol use: Yes     Comment: very occasional                                Counseling given: Not Answered      Vital Signs (Current): There were no vitals filed for this visit.                                            BP Readings from Last 3 Encounters:   04/06/21 129/78   01/13/21 (!) 89/53   01/13/21 98/61       NPO Status:                                                                                 BMI:   Wt Readings from Last 3 Encounters:   04/06/21 183 lb (83 kg)   01/12/21 183 lb (83 kg)   12/23/20 183 lb (83 kg)     There is no height or weight on file to calculate BMI.    CBC: No results found for: WBC, RBC, HGB, HCT, MCV, RDW, PLT    CMP:   Lab Results   Component Value Date     02/28/2014    K 4.3 02/28/2014     02/28/2014    CO2 29 02/28/2014    BUN 14 02/28/2014    CREATININE 0.97 02/28/2014    GFRAA >60 02/28/2014    LABGLOM >60 02/28/2014    GLUCOSE 108 02/28/2014    CALCIUM 9.7 02/28/2014       POC Tests:   No results for input(s): POCGLU, POCNA, POCK, POCCL, POCBUN, POCHEMO, POCHCT in the last 72 hours. Coags: No results found for: PROTIME, INR, APTT    HCG (If Applicable): No results found for: PREGTESTUR, PREGSERUM, HCG, HCGQUANT     ABGs: No results found for: PHART, PO2ART, EDM1ZOC, OWZ4MZP, BEART, J3YWENDE     Type & Screen (If Applicable):  No results found for: LABABO, LABRH    Drug/Infectious Status (If Applicable):  No results found for: HIV, HEPCAB    COVID-19 Screening (If Applicable):   Lab Results   Component Value Date    COVID19 Not Detected 04/01/2021         Anesthesia Evaluation  Patient summary reviewed and Nursing notes reviewed no history of anesthetic complications:   Airway: Mallampati: II  TM distance: >3 FB   Neck ROM: full  Mouth opening: > = 3 FB Dental: normal exam         Pulmonary:       (-) asthma and not a current smoker                           Cardiovascular:  Exercise tolerance: good (>4 METS),   (+) hypertension:, hyperlipidemia                  Neuro/Psych:      (-) CVA           GI/Hepatic/Renal:   (+) GERD: well controlled,          ROS comment: Branch esophagus . Endo/Other:    (+) DiabetesType II DM, , hypothyroidism::., .                 Abdominal:           Vascular:           ROS comment: H/o LE DVT. Anesthesia Plan      MAC     ASA 2     (-npo MN  -DM x10 yr  -denies chest pain or palp)  Induction: intravenous. Anesthetic plan and risks discussed with patient. Plan discussed with CRNA.                   Jorge Macdonald MD   4/6/2021

## 2021-04-06 NOTE — H&P
History and Physical / Pre-Sedation Assessment    Patient:  Charis Morrison   :   1955     Intended Procedure:  egd    HPI: dysplasia in recent biopsies of GEJ . After further discussion with pathology decision is made to obtain more biopsies because of concern of high grade dysplasia or malignant changes in this area. Past Medical History:   has a past medical history of Branch esophagus, Diabetes mellitus ([address] Esophageal lesion, H/O removal of cyst, Hx of blood clots, Hyperlipidemia, Hypertension, Pneumonia, and Thyroid disease. Past Surgical History:   has a past surgical history that includes Colonoscopy; Endoscopy, colon, diagnostic (13); fracture surgery (Left, spring 2006); Upper gastrointestinal endoscopy (N/A, 2018); Colonoscopy (N/A, 2019); Upper gastrointestinal endoscopy (N/A, 2019); Upper gastrointestinal endoscopy (N/A, 2020); and Upper gastrointestinal endoscopy (N/A, 2021). Medications:  Prior to Admission medications    Medication Sig Start Date End Date Taking? Authorizing Provider   levothyroxine (SYNTHROID) 125 MCG tablet Take 125 mcg by mouth Daily KIRILL   Yes Historical Provider, MD   sitaGLIPtin-metFORMIN (JANUMET XR)  MG TB24 tablet Take 1 tablet by mouth 2 times daily. Yes Historical Provider, MD   ezetimibe-simvastatin (VYTORIN) 10-40 MG per tablet Take 1 tablet by mouth nightly. Yes Historical Provider, MD   losartan-hydrochlorothiazide (HYZAAR) 100-25 MG per tablet Take 1 tablet by mouth daily. Yes Historical Provider, MD   Multiple Vitamins-Minerals (THERAPEUTIC MULTIVITAMIN-MINERALS) tablet Take 1 tablet by mouth daily. Historical Provider, MD       Family History:  family history is not on file. Social History:   reports that he has never smoked. He has never used smokeless tobacco. He reports current alcohol use. He reports that he does not use drugs.     Allergies:  Latex and Morphine    ROS:  twelve point system review was unremarkable except for above noted history. Nurses notes reviewed and agreed. Medications reviewed    Physical Exam:  Vital Signs: /78   Pulse 65   Temp 97.6 °F (36.4 °C) (Temporal)   Resp 18   Ht 6' 1\" (1.854 m)   Wt 183 lb (83 kg)   SpO2 99%   BMI 24.14 kg/m²    Skin: normal  HEENT: normal  Neck: supple. No adenopathy. No thyromegaly. No JVD. Pulmonary:Normal  Cardiac:Normal  Abdomen:Normal  MS: normal  Neuro: normal  Ext: no edema. Pulses normal    Pre-Procedure Assessment / Plan:  ASA 2 - Patient with mild systemic disease with no functional limitations  Mallampati Airway Assessment:  Mallampati Class II - (soft palate, fauces & uvula are visible)  Level of Sedation Plan: Moderate sedation  Post Procedure plan: Return to same level of care    I assessed the patient and find that the patient is in satisfactory condition to proceed with the planned procedure and sedation plan. I have explained the risk, benefits, and alternatives to the procedure. The patient understands and agrees to proceed.   Luis Felipe Mcdermott  7:42 AM 4/6/2021

## 2021-04-07 NOTE — OP NOTE
[address]                                OPERATIVE REPORT    PATIENT NAME: Peace Shanks                     :        1955  MED REC NO:   [id number]                          ROOM:  ACCOUNT NO:   [de-identified]                           ADMIT DATE: 2021  PROVIDER:     Julius Claude, MD    DATE OF PROCEDURE:  2021    SURGEON:  Julius Claude, MD    INDICATION FOR THE PROCEDURE:  A 81-year-old gentleman who had hiatus hernia____  Branch's esophagus that required radiofrequency ablation with  successful resolution of Branch's. Recent biopsy of GE junction  revealed low-grade dysplasia in the gastric epithelium. Further  biopsies were obtained and upon conversation with Dr. Tiffani Ellsworth, once  again, recommendation was made to obtain even more biopsies for the  concern of high-grade dysplasia or even malignant changes in that area. Today, he is having followup EGD and more biopsies. DESCRIPTION OF PROCEDURE:  With the patient in the left lateral position  and after IV Diprivan, the Olympus video endoscope was introduced into  the esophagus and advanced towards GE junction. Numerous biopsies were  obtained from the GE junction and further toward the gastric sites. Hiatus hernia was identified. Stomach was normal.  The duodenum was  normal.  Scope was then removed without complications. IMPRESSION:  1. Hiatus hernia. 2.  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 Further biopsies were obtained to rule out high-grade dysplasia or  cancerous changes. 3.  Branch's, satisfactory resolution, status post RFA. PLAN:  We will await pathology finding and follow as an outpatient.    Tom Brooks MD    D: 2021 8:37:10       T: 2021 9:13:18     [email or web address]  Job#: [id number]     Doc#: [id number]    CC:   Julius Claude, MD

## 2021-05-21 ENCOUNTER — OFFICE VISIT (OUTPATIENT)
Dept: PRIMARY CARE CLINIC | Age: 66
End: 2021-05-21
Payer: COMMERCIAL

## 2021-05-21 DIAGNOSIS — Z01.818 PRE-OP EXAMINATION: Primary | ICD-10-CM

## 2021-05-21 LAB — SARS-COV-2: NOT DETECTED

## 2021-05-21 PROCEDURE — 99421 OL DIG E/M SVC 5-10 MIN: CPT | Performed by: NURSE PRACTITIONER

## 2021-05-26 ENCOUNTER — HOSPITAL ENCOUNTER (OUTPATIENT)
Age: 66
Setting detail: OUTPATIENT SURGERY
Discharge: HOME | End: 2021-05-26
Attending: INTERNAL MEDICINE | Admitting: INTERNAL MEDICINE
Payer: COMMERCIAL

## 2021-05-26 ENCOUNTER — ANESTHESIA EVENT (OUTPATIENT)
Dept: ENDOSCOPY | Age: 66
End: 2021-05-26
Payer: COMMERCIAL

## 2021-05-26 ENCOUNTER — ANESTHESIA (OUTPATIENT)
Dept: ENDOSCOPY | Age: 66
End: 2021-05-26
Payer: COMMERCIAL

## 2021-05-26 VITALS
OXYGEN SATURATION: 99 % | RESPIRATION RATE: 13 BRPM | SYSTOLIC BLOOD PRESSURE: 87 MMHG | DIASTOLIC BLOOD PRESSURE: 53 MMHG

## 2021-05-26 VITALS
TEMPERATURE: 97.3 F | DIASTOLIC BLOOD PRESSURE: 66 MMHG | HEART RATE: 62 BPM | BODY MASS INDEX: 24.39 KG/M2 | SYSTOLIC BLOOD PRESSURE: 103 MMHG | WEIGHT: 184 LBS | HEIGHT: 73 IN | RESPIRATION RATE: 16 BRPM | OXYGEN SATURATION: 95 %

## 2021-05-26 DIAGNOSIS — K22.70 BARRETT'S ESOPHAGUS WITHOUT DYSPLASIA: ICD-10-CM

## 2021-05-26 DIAGNOSIS — R13.10 DYSPHAGIA, UNSPECIFIED TYPE: ICD-10-CM

## 2021-05-26 DIAGNOSIS — K21.9 GASTROESOPHAGEAL REFLUX DISEASE, UNSPECIFIED WHETHER ESOPHAGITIS PRESENT: ICD-10-CM

## 2021-05-26 LAB
GLUCOSE BLD-MCNC: 134 MG/DL (ref 70–99)
PERFORMED ON: ABNORMAL

## 2021-05-26 PROCEDURE — 3609012400 HC EGD TRANSORAL BIOPSY SINGLE/MULTIPLE: Performed by: INTERNAL MEDICINE

## 2021-05-26 PROCEDURE — 2580000003 HC RX 258: Performed by: INTERNAL MEDICINE

## 2021-05-26 PROCEDURE — 6360000002 HC RX W HCPCS: Performed by: INTERNAL MEDICINE

## 2021-05-26 PROCEDURE — 3700000001 HC ADD 15 MINUTES (ANESTHESIA): Performed by: INTERNAL MEDICINE

## 2021-05-26 PROCEDURE — 6360000002 HC RX W HCPCS: Performed by: NURSE ANESTHETIST, CERTIFIED REGISTERED

## 2021-05-26 PROCEDURE — 7100000010 HC PHASE II RECOVERY - FIRST 15 MIN: Performed by: INTERNAL MEDICINE

## 2021-05-26 PROCEDURE — 88305 TISSUE EXAM BY PATHOLOGIST: CPT

## 2021-05-26 PROCEDURE — 3700000000 HC ANESTHESIA ATTENDED CARE: Performed by: INTERNAL MEDICINE

## 2021-05-26 PROCEDURE — 2709999900 HC NON-CHARGEABLE SUPPLY: Performed by: INTERNAL MEDICINE

## 2021-05-26 PROCEDURE — 3609013200 HC EGD W/ ABLATION: Performed by: INTERNAL MEDICINE

## 2021-05-26 PROCEDURE — 7100000011 HC PHASE II RECOVERY - ADDTL 15 MIN: Performed by: INTERNAL MEDICINE

## 2021-05-26 RX ORDER — DEXTROSE AND SODIUM CHLORIDE 5; .9 G/100ML; G/100ML
INJECTION, SOLUTION INTRAVENOUS CONTINUOUS
Status: DISCONTINUED | OUTPATIENT
Start: 2021-05-26 | End: 2021-05-26 | Stop reason: HOSPADM

## 2021-05-26 RX ORDER — SODIUM CHLORIDE, SODIUM LACTATE, POTASSIUM CHLORIDE, CALCIUM CHLORIDE 600; 310; 30; 20 MG/100ML; MG/100ML; MG/100ML; MG/100ML
INJECTION, SOLUTION INTRAVENOUS CONTINUOUS
Status: DISCONTINUED | OUTPATIENT
Start: 2021-05-26 | End: 2021-05-26 | Stop reason: HOSPADM

## 2021-05-26 RX ORDER — ACETYLCYSTEINE 200 MG/ML
SOLUTION ORAL; RESPIRATORY (INHALATION) PRN
Status: DISCONTINUED | OUTPATIENT
Start: 2021-05-26 | End: 2021-05-26 | Stop reason: ALTCHOICE

## 2021-05-26 RX ORDER — LIDOCAINE HYDROCHLORIDE 20 MG/ML
INJECTION, SOLUTION INTRAVENOUS PRN
Status: DISCONTINUED | OUTPATIENT
Start: 2021-05-26 | End: 2021-05-26 | Stop reason: SDUPTHER

## 2021-05-26 RX ORDER — PROPOFOL 10 MG/ML
INJECTION, EMULSION INTRAVENOUS PRN
Status: DISCONTINUED | OUTPATIENT
Start: 2021-05-26 | End: 2021-05-26 | Stop reason: SDUPTHER

## 2021-05-26 RX ORDER — ONDANSETRON 2 MG/ML
4 INJECTION INTRAMUSCULAR; INTRAVENOUS
Status: DISCONTINUED | OUTPATIENT
Start: 2021-05-26 | End: 2021-05-26 | Stop reason: HOSPADM

## 2021-05-26 RX ADMIN — PROPOFOL 150 MG: 10 INJECTION, EMULSION INTRAVENOUS at 07:47

## 2021-05-26 RX ADMIN — DEXTROSE AND SODIUM CHLORIDE: 5; 900 INJECTION, SOLUTION INTRAVENOUS at 06:48

## 2021-05-26 RX ADMIN — PROPOFOL 50 MG: 10 INJECTION, EMULSION INTRAVENOUS at 07:56

## 2021-05-26 RX ADMIN — LIDOCAINE HYDROCHLORIDE 100 MG: 20 INJECTION, SOLUTION INTRAVENOUS at 07:46

## 2021-05-26 RX ADMIN — PROPOFOL 50 MG: 10 INJECTION, EMULSION INTRAVENOUS at 07:54

## 2021-05-26 RX ADMIN — PROPOFOL 50 MG: 10 INJECTION, EMULSION INTRAVENOUS at 07:51

## 2021-05-26 RX ADMIN — SODIUM CHLORIDE, POTASSIUM CHLORIDE, SODIUM LACTATE AND CALCIUM CHLORIDE: 600; 310; 30; 20 INJECTION, SOLUTION INTRAVENOUS at 06:59

## 2021-05-26 RX ADMIN — PROPOFOL 50 MG: 10 INJECTION, EMULSION INTRAVENOUS at 08:00

## 2021-05-26 ASSESSMENT — PAIN SCALES - GENERAL: PAINLEVEL_OUTOF10: 0

## 2021-05-26 ASSESSMENT — PULMONARY FUNCTION TESTS
PIF_VALUE: 1
PIF_VALUE: 0
PIF_VALUE: 1
PIF_VALUE: 1
PIF_VALUE: 0
PIF_VALUE: 1

## 2021-05-26 ASSESSMENT — PAIN - FUNCTIONAL ASSESSMENT: PAIN_FUNCTIONAL_ASSESSMENT: 0-10

## 2021-05-26 NOTE — ANESTHESIA PRE PROCEDURE
Department of Anesthesiology  Preprocedure Note       Name:  Vamsi Peterson   Age:  61 y.o.  :  1955                                          MRN:  [id number]         Date:  2021      Surgeon: Elizabeth Pérez):  Rom Lebron MD    Procedure: Procedure(s):  ESOPHAGOGASTRODUODENOSCOPY WITH RADIOFREQUENCY ABLATION    Medications prior to admission:   Prior to Admission medications    Medication Sig Start Date End Date Taking? Authorizing Provider   levothyroxine (SYNTHROID) 125 MCG tablet Take 125 mcg by mouth Daily KIRILL   Yes Historical Provider, MD   Multiple Vitamins-Minerals (THERAPEUTIC MULTIVITAMIN-MINERALS) tablet Take 1 tablet by mouth daily. Yes Historical Provider, MD   sitaGLIPtin-metFORMIN (JANUMET XR)  MG TB24 tablet Take 1 tablet by mouth 2 times daily. Yes Historical Provider, MD   ezetimibe-simvastatin (VYTORIN) 10-40 MG per tablet Take 1 tablet by mouth nightly. Yes Historical Provider, MD   losartan-hydrochlorothiazide (HYZAAR) 100-25 MG per tablet Take 1 tablet by mouth daily. Yes Historical Provider, MD       Current medications:    Current Facility-Administered Medications   Medication Dose Route Frequency Provider Last Rate Last Admin    dextrose 5 % and 0.9 % sodium chloride infusion   Intravenous Continuous Rom Lebron  mL/hr at 21 0648 New Bag at 21 0648    lactated ringers infusion   Intravenous Continuous Rom Lebron  mL/hr at 21 0659 New Bag at 21 0918       Allergies: Allergies   Allergen Reactions    Latex Rash     Any latex / rubber products    Morphine Nausea And Vomiting       Problem List:  There is no problem list on file for this patient.       Past Medical History:        Diagnosis Date    Branch esophagus     Diabetes mellitus ([address]     Type 2    Esophageal lesion     H/O removal of cyst     shin    Hx of blood clots spring 2008    L ankle break; clots in leg and PE    Hyperlipidemia     Hypertension  Pneumonia 3 WEEKS AGO     FINISHED ANTIBIOTICS ALREADY    Thyroid disease        Past Surgical History:        Procedure Laterality Date    COLONOSCOPY      COLONOSCOPY N/A 5/8/2019    COLONOSCOPY performed by Kimberly Logan MD at [address] DIAGNOSTIC  11/07/13    EGD with Ablation    FRACTURE SURGERY Left spring 2006    ankle-leg clot & PE    UPPER GASTROINTESTINAL ENDOSCOPY N/A 12/19/2018    EGD BIOPSY  (Gastric for H. Pylori) performed by Kimberly Logan MD at [address] [place] [address] 12/20/2019    EGD BIOPSY performed by Kimberly Logan MD at [address] [place] [address] N/A 12/23/2020    EGD BIOPSY performed by Kimberly Logan MD at [address] [place] [address] N/A 1/13/2021    EGD BIOPSY performed by Kimberly Logan MD at [address] [place] [address] N/A 4/6/2021    EGD BIOPSY performed by Kimberly Logan MD at [address] History:    Social History     Tobacco Use    Smoking status: Never Smoker    Smokeless tobacco: Never Used   Substance Use Topics    Alcohol use: Yes     Comment: very occasional                                Counseling given: Not Answered      Vital Signs (Current):   Vitals:    05/26/21 0630   BP: 127/64   Pulse: 73   Resp: 18   Temp: 97.8 °F (36.6 °C)   TempSrc: Temporal   SpO2: 99%   Weight: 184 lb (83.5 kg)   Height: 6' 1\" (1.854 m)                                              BP Readings from Last 3 Encounters:   05/26/21 127/64   04/06/21 (!) 89/53   04/06/21 103/71       NPO Status: Time of last liquid consumption: 1930                        Time of last solid consumption: 1930                        Date of last liquid consumption: 05/25/21                        Date of last solid food consumption: 05/25/21    BMI:   Wt Readings from Last 3 Encounters:   05/26/21 184 lb (83.5 kg)   04/06/21 183 lb (83 kg)   01/12/21 183 lb (83 kg)     Body patient. Use of blood products discussed with patient whom consented to blood products. Plan discussed with attending and CRNA.     Attending anesthesiologist reviewed and agrees with Pre Eval content              Kameron Gifford, DO   5/26/2021

## 2021-05-26 NOTE — H&P
History and Physical / Pre-Sedation Assessment    Patient:  Arun Tamez   :   1955     Intended Procedure:  egd and rfa    HPI: cornell . Dysplasia in gastric epithelium . Needs an rfa    Past Medical History:   has a past medical history of Cornell esophagus, Diabetes mellitus ([facility] [address] Esophageal lesion, H/O removal of cyst, Hx of blood clots, Hyperlipidemia, Hypertension, Pneumonia, and Thyroid disease. Past Surgical History:   has a past surgical history that includes Colonoscopy; Endoscopy, colon, diagnostic (13); fracture surgery (Left, spring 2006); Upper gastrointestinal endoscopy (N/A, 2018); Colonoscopy (N/A, 2019); Upper gastrointestinal endoscopy (N/A, 2019); Upper gastrointestinal endoscopy (N/A, 2020); Upper gastrointestinal endoscopy (N/A, 2021); and Upper gastrointestinal endoscopy (N/A, 2021). Medications:  Prior to Admission medications    Medication Sig Start Date End Date Taking? Authorizing Provider   levothyroxine (SYNTHROID) 125 MCG tablet Take 125 mcg by mouth Daily KIRILL   Yes Historical Provider, MD   Multiple Vitamins-Minerals (THERAPEUTIC MULTIVITAMIN-MINERALS) tablet Take 1 tablet by mouth daily. Yes Historical Provider, MD   sitaGLIPtin-metFORMIN (JANUMET XR)  MG TB24 tablet Take 1 tablet by mouth 2 times daily. Yes Historical Provider, MD   ezetimibe-simvastatin (VYTORIN) 10-40 MG per tablet Take 1 tablet by mouth nightly. Yes Historical Provider, MD   losartan-hydrochlorothiazide (HYZAAR) 100-25 MG per tablet Take 1 tablet by mouth daily. Yes Historical Provider, MD       Family History:  family history is not on file. Social History:   reports that he has never smoked. He has never used smokeless tobacco. He reports current alcohol use. He reports that he does not use drugs. Allergies:  Latex and Morphine    ROS:  twelve point system review was unremarkable except for above noted history.     Nurses notes reviewed and agreed. Medications reviewed    Physical Exam:  Vital Signs: /64   Pulse 73   Temp 97.8 °F (36.6 °C) (Temporal)   Resp 18   Ht 6' 1\" (1.854 m)   Wt 184 lb (83.5 kg)   SpO2 99%   BMI 24.28 kg/m²    Skin: normal  HEENT: normal  Neck: supple. No adenopathy. No thyromegaly. No JVD. Pulmonary:Normal  Cardiac:Normal  Abdomen:Normal  MS: normal  Neuro: normal  Ext: no edema. Pulses normal    Pre-Procedure Assessment / Plan:  ASA 2 - Patient with mild systemic disease with no functional limitations  Mallampati Airway Assessment:  Mallampati Class II - (soft palate, fauces & uvula are visible)  Level of Sedation Plan: Moderate sedation  Post Procedure plan: Return to same level of care    I assessed the patient and find that the patient is in satisfactory condition to proceed with the planned procedure and sedation plan. I have explained the risk, benefits, and alternatives to the procedure. The patient understands and agrees to proceed.   Yes    Shelby Kilpatrick MD  7:36 AM 5/26/2021

## 2021-05-26 NOTE — PROGRESS NOTES
Discharge instructions reviewed with patient/responsible adult and understanding verbalized. Discharge instructions signed and copies given. Patient to be discharged home with belongings. Wife with pt. HOB elevated 75 degrees without complaints.

## 2021-07-29 ENCOUNTER — ANESTHESIA EVENT (OUTPATIENT)
Dept: ENDOSCOPY | Age: 66
End: 2021-07-29
Payer: COMMERCIAL

## 2021-07-30 ENCOUNTER — ANESTHESIA (OUTPATIENT)
Dept: ENDOSCOPY | Age: 66
End: 2021-07-30
Payer: COMMERCIAL

## 2021-07-30 ENCOUNTER — HOSPITAL ENCOUNTER (OUTPATIENT)
Age: 66
Setting detail: OUTPATIENT SURGERY
Discharge: HOME | End: 2021-07-30
Attending: INTERNAL MEDICINE | Admitting: INTERNAL MEDICINE
Payer: COMMERCIAL

## 2021-07-30 VITALS
TEMPERATURE: 96.9 F | HEIGHT: 73 IN | SYSTOLIC BLOOD PRESSURE: 106 MMHG | RESPIRATION RATE: 15 BRPM | BODY MASS INDEX: 24.39 KG/M2 | WEIGHT: 184 LBS | OXYGEN SATURATION: 99 % | DIASTOLIC BLOOD PRESSURE: 71 MMHG | HEART RATE: 71 BPM

## 2021-07-30 VITALS — DIASTOLIC BLOOD PRESSURE: 63 MMHG | SYSTOLIC BLOOD PRESSURE: 103 MMHG | OXYGEN SATURATION: 98 %

## 2021-07-30 DIAGNOSIS — K22.70 BARRETT'S ESOPHAGUS WITHOUT DYSPLASIA: ICD-10-CM

## 2021-07-30 LAB
GLUCOSE BLD-MCNC: 114 MG/DL (ref 70–99)
PERFORMED ON: ABNORMAL

## 2021-07-30 PROCEDURE — 7100000011 HC PHASE II RECOVERY - ADDTL 15 MIN: Performed by: INTERNAL MEDICINE

## 2021-07-30 PROCEDURE — 2500000003 HC RX 250 WO HCPCS: Performed by: NURSE ANESTHETIST, CERTIFIED REGISTERED

## 2021-07-30 PROCEDURE — 88305 TISSUE EXAM BY PATHOLOGIST: CPT

## 2021-07-30 PROCEDURE — 3609013200 HC EGD W/ ABLATION: Performed by: INTERNAL MEDICINE

## 2021-07-30 PROCEDURE — 6360000002 HC RX W HCPCS: Performed by: NURSE ANESTHETIST, CERTIFIED REGISTERED

## 2021-07-30 PROCEDURE — C1769 GUIDE WIRE: HCPCS | Performed by: INTERNAL MEDICINE

## 2021-07-30 PROCEDURE — 3700000001 HC ADD 15 MINUTES (ANESTHESIA): Performed by: INTERNAL MEDICINE

## 2021-07-30 PROCEDURE — 2580000003 HC RX 258: Performed by: ANESTHESIOLOGY

## 2021-07-30 PROCEDURE — 2709999900 HC NON-CHARGEABLE SUPPLY: Performed by: INTERNAL MEDICINE

## 2021-07-30 PROCEDURE — 2580000003 HC RX 258: Performed by: NURSE ANESTHETIST, CERTIFIED REGISTERED

## 2021-07-30 PROCEDURE — 3609012400 HC EGD TRANSORAL BIOPSY SINGLE/MULTIPLE: Performed by: INTERNAL MEDICINE

## 2021-07-30 PROCEDURE — 7100000010 HC PHASE II RECOVERY - FIRST 15 MIN: Performed by: INTERNAL MEDICINE

## 2021-07-30 PROCEDURE — 3700000000 HC ANESTHESIA ATTENDED CARE: Performed by: INTERNAL MEDICINE

## 2021-07-30 RX ORDER — LIDOCAINE HYDROCHLORIDE 20 MG/ML
INJECTION, SOLUTION EPIDURAL; INFILTRATION; INTRACAUDAL; PERINEURAL PRN
Status: DISCONTINUED | OUTPATIENT
Start: 2021-07-30 | End: 2021-07-30 | Stop reason: SDUPTHER

## 2021-07-30 RX ORDER — SODIUM CHLORIDE, SODIUM LACTATE, POTASSIUM CHLORIDE, CALCIUM CHLORIDE 600; 310; 30; 20 MG/100ML; MG/100ML; MG/100ML; MG/100ML
INJECTION, SOLUTION INTRAVENOUS CONTINUOUS
Status: DISCONTINUED | OUTPATIENT
Start: 2021-07-30 | End: 2021-07-30 | Stop reason: HOSPADM

## 2021-07-30 RX ORDER — SODIUM CHLORIDE 0.9 % (FLUSH) 0.9 %
10 SYRINGE (ML) INJECTION EVERY 12 HOURS SCHEDULED
Status: DISCONTINUED | OUTPATIENT
Start: 2021-07-30 | End: 2021-07-30 | Stop reason: HOSPADM

## 2021-07-30 RX ORDER — SODIUM CHLORIDE 9 MG/ML
INJECTION, SOLUTION INTRAVENOUS CONTINUOUS
Status: DISCONTINUED | OUTPATIENT
Start: 2021-07-30 | End: 2021-07-30 | Stop reason: HOSPADM

## 2021-07-30 RX ORDER — SODIUM CHLORIDE, SODIUM LACTATE, POTASSIUM CHLORIDE, CALCIUM CHLORIDE 600; 310; 30; 20 MG/100ML; MG/100ML; MG/100ML; MG/100ML
INJECTION, SOLUTION INTRAVENOUS CONTINUOUS PRN
Status: DISCONTINUED | OUTPATIENT
Start: 2021-07-30 | End: 2021-07-30 | Stop reason: SDUPTHER

## 2021-07-30 RX ORDER — SODIUM CHLORIDE 0.9 % (FLUSH) 0.9 %
10 SYRINGE (ML) INJECTION PRN
Status: DISCONTINUED | OUTPATIENT
Start: 2021-07-30 | End: 2021-07-30 | Stop reason: HOSPADM

## 2021-07-30 RX ORDER — PROPOFOL 10 MG/ML
INJECTION, EMULSION INTRAVENOUS PRN
Status: DISCONTINUED | OUTPATIENT
Start: 2021-07-30 | End: 2021-07-30 | Stop reason: SDUPTHER

## 2021-07-30 RX ORDER — SODIUM CHLORIDE 9 MG/ML
25 INJECTION, SOLUTION INTRAVENOUS PRN
Status: DISCONTINUED | OUTPATIENT
Start: 2021-07-30 | End: 2021-07-30 | Stop reason: HOSPADM

## 2021-07-30 RX ADMIN — PROPOFOL 50 MG: 10 INJECTION, EMULSION INTRAVENOUS at 07:55

## 2021-07-30 RX ADMIN — LIDOCAINE HYDROCHLORIDE 80 MG: 20 INJECTION, SOLUTION EPIDURAL; INFILTRATION; INTRACAUDAL; PERINEURAL at 07:53

## 2021-07-30 RX ADMIN — SODIUM CHLORIDE, SODIUM LACTATE, POTASSIUM CHLORIDE, AND CALCIUM CHLORIDE: .6; .31; .03; .02 INJECTION, SOLUTION INTRAVENOUS at 07:42

## 2021-07-30 RX ADMIN — SODIUM CHLORIDE, POTASSIUM CHLORIDE, SODIUM LACTATE AND CALCIUM CHLORIDE: 600; 310; 30; 20 INJECTION, SOLUTION INTRAVENOUS at 07:04

## 2021-07-30 RX ADMIN — PROPOFOL 50 MG: 10 INJECTION, EMULSION INTRAVENOUS at 07:53

## 2021-07-30 ASSESSMENT — PULMONARY FUNCTION TESTS
PIF_VALUE: 1
PIF_VALUE: 3
PIF_VALUE: 0
PIF_VALUE: 1
PIF_VALUE: 1
PIF_VALUE: 3
PIF_VALUE: 1
PIF_VALUE: 0
PIF_VALUE: 1
PIF_VALUE: 0
PIF_VALUE: 1

## 2021-07-30 ASSESSMENT — PAIN - FUNCTIONAL ASSESSMENT: PAIN_FUNCTIONAL_ASSESSMENT: 0-10

## 2021-07-30 ASSESSMENT — PAIN SCALES - GENERAL: PAINLEVEL_OUTOF10: 0

## 2021-07-30 ASSESSMENT — PAIN SCALES - WONG BAKER
WONGBAKER_NUMERICALRESPONSE: 0
WONGBAKER_NUMERICALRESPONSE: 0

## 2021-07-30 NOTE — OP NOTE
[address]                                OPERATIVE REPORT    PATIENT NAME: William Lunsford                     :        1955  MED REC NO:   [id number]                          ROOM:  ACCOUNT NO:   [de-identified]                           ADMIT DATE: 2021  PROVIDER:     Madeline Josue MD    DATE OF PROCEDURE:  2021    SURGEON:  Madeline Josue MD    INDICATION FOR PROCEDURE:  A 28-year-old man with Branch's esophagus  and low-grade dysplasia noted in gastric site. Today, he is having  followup radiofrequency ablation. DESCRIPTION OF PROCEDURE:  With the patient in the left lateral position  and after IV Diprivan, the Olympus video endoscope was introduced into  esophagus and advanced towards the GE junction. Hiatus hernia was  identified and radiofrequency ablation was performed on the gastric  epithelium around the circumference of the entire hiatus hernia from its  contact with the esophagus and further down. 36 applications were  performed successfully. The stomach was normal and biopsy was obtained  for Helicobacter pylori. The duodenum was normal.  The scope was then  removed without complication. IMPRESSION:  1. Hiatus hernia. 2.  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 Radiofrequency ablation was performed. 3.  Branch's esophagus. Status post RFA. Excellent resolution. EBL:  None. Jerrell Umanzor MD    D: 2021 8:30:13       T: 2021 10:23:54     [email or web address]  Job#: [id number]     Doc#: [id number]    CC:   Madeline Josue MD

## 2021-07-30 NOTE — H&P
History and Physical / Pre-Sedation Assessment    Patient:  Kassandra Olmedo   :   1955     Intended Procedure:  egd and rfa    HPI: cornell. Dysplasia noted on gastric side. Needs fu rfa    Past Medical History:   has a past medical history of Cornell esophagus, Diabetes mellitus ([facility] [address] Esophageal lesion, H/O removal of cyst, Hx of blood clots, Hyperlipidemia, Hypertension, Pneumonia, and Thyroid disease. Past Surgical History:   has a past surgical history that includes Colonoscopy; Endoscopy, colon, diagnostic (13); fracture surgery (Left, spring 2006); Upper gastrointestinal endoscopy (N/A, 2018); Colonoscopy (N/A, 2019); Upper gastrointestinal endoscopy (N/A, 2019); Upper gastrointestinal endoscopy (N/A, 2020); Upper gastrointestinal endoscopy (N/A, 2021); Upper gastrointestinal endoscopy (N/A, 2021); Upper gastrointestinal endoscopy (N/A, 2021); and Upper gastrointestinal endoscopy (N/A, 2021). Medications:  Prior to Admission medications    Medication Sig Start Date End Date Taking? Authorizing Provider   Lansoprazole (PREVACID PO) Take by mouth daily   Yes Historical Provider, MD   levothyroxine (SYNTHROID) 125 MCG tablet Take 125 mcg by mouth Daily KIRILL   Yes Historical Provider, MD   Multiple Vitamins-Minerals (THERAPEUTIC MULTIVITAMIN-MINERALS) tablet Take 1 tablet by mouth daily. Yes Historical Provider, MD   sitaGLIPtin-metFORMIN (JANUMET XR)  MG TB24 tablet Take 1 tablet by mouth 2 times daily. Yes Historical Provider, MD   ezetimibe-simvastatin (VYTORIN) 10-40 MG per tablet Take 1 tablet by mouth nightly. Yes Historical Provider, MD   losartan-hydrochlorothiazide (HYZAAR) 100-25 MG per tablet Take 1 tablet by mouth daily. Yes Historical Provider, MD       Family History:  family history is not on file. Social History:   reports that he has never smoked. He has never used smokeless tobacco. He reports current alcohol use.  He reports that he does not use drugs. Allergies:  Latex and Morphine    ROS:  twelve point system review was unremarkable except for above noted history. Nurses notes reviewed and agreed. Medications reviewed    Physical Exam:  Vital Signs: /69   Pulse 77   Temp 97.9 °F (36.6 °C) (Temporal)   Resp 16   Ht 6' 1\" (1.854 m)   Wt 184 lb (83.5 kg)   BMI 24.28 kg/m²    Skin: normal  HEENT: normal  Neck: supple. No adenopathy. No thyromegaly. No JVD. Pulmonary:Normal  Cardiac:Normal  Abdomen:Normal  MS: normal  Neuro: normal  Ext: no edema. Pulses normal    Pre-Procedure Assessment / Plan:  ASA 2 - Patient with mild systemic disease with no functional limitations  Mallampati Airway Assessment:  Mallampati Class II - (soft palate, fauces & uvula are visible)  Level of Sedation Plan: Moderate sedation  Post Procedure plan: Return to same level of care    I assessed the patient and find that the patient is in satisfactory condition to proceed with the planned procedure and sedation plan. I have explained the risk, benefits, and alternatives to the procedure. The patient understands and agrees to proceed.   Yes    Meagan Castillo MD  7:47 AM 7/30/2021

## 2021-07-30 NOTE — PROGRESS NOTES
Ambulatory Surgery/Procedure Discharge Note    Vitals:    07/30/21 0827   BP: 106/71   Pulse: 71   Resp: 15   Temp: 96.9 °F (36.1 °C)   SpO2: 99%       In: 300 [I.V.:300]  Out: -     Restroom use offered before discharge. Yes  Pt refusing PO offered. Discharge instructions were read at bedside. On full liquid diet today and soft tomorrow. Ordered to take PPI twice  A day for a week. Pain assessment:  none  Pain Level: 0        Patient discharged to home/self care.  Patient discharged and walk the pt to waiting family/S.O.       7/30/2021 9:02 AM

## 2021-07-30 NOTE — ANESTHESIA POSTPROCEDURE EVALUATION
Department of Anesthesiology  Postprocedure Note    Patient: Celine Dominguez  MRN: [id number]  YOB: 1955  Date of evaluation: 7/30/2021  Time:  12:59 PM     Procedure Summary     Date: 07/30/21 Room / Location: [address] [place] [address]    Anesthesia Start: 9841 Anesthesia Stop: 0813    Procedures:       ESOPHAGOGASTRODUODENOSCOPY RADIOFREQUENCY ABLATION (N/A )      EGD BIOPSY (N/A ) Diagnosis:       Branch's esophagus without dysplasia      (Branch's esophagus without dysplasia [K22.70])    Surgeons: Belen Bond MD Responsible Provider: Amy Henao DO    Anesthesia Type: MAC ASA Status: 2          Anesthesia Type: MAC    Joe Phase I: Joe Score: 10    Joe Phase II: Joe Score: 10    Last vitals: Reviewed and per EMR flowsheets.        Anesthesia Post Evaluation    Patient location during evaluation: PACU  Patient participation: complete - patient participated  Level of consciousness: awake and alert  Airway patency: patent  Nausea & Vomiting: no nausea and no vomiting  Cardiovascular status: blood pressure returned to baseline  Respiratory status: acceptable  Hydration status: euvolemic

## 2021-07-30 NOTE — ANESTHESIA PRE PROCEDURE
Department of Anesthesiology  Preprocedure Note       Name:  Jessica Frankel   Age:  61 y.o.  :  1955                                          MRN:  [id number]         Date:  2021      Surgeon: Melquiades Griffith):  Niraj Philip MD    Procedure: Procedure(s):  ESOPHAGOGASTRODUODENOSCOPY RADIOFREQUENCY ABLATION    Medications prior to admission:   Prior to Admission medications    Medication Sig Start Date End Date Taking? Authorizing Provider   Lansoprazole (PREVACID PO) Take by mouth daily    Historical Provider, MD   levothyroxine (SYNTHROID) 125 MCG tablet Take 125 mcg by mouth Daily KIRILL    Historical Provider, MD   Multiple Vitamins-Minerals (THERAPEUTIC MULTIVITAMIN-MINERALS) tablet Take 1 tablet by mouth daily. Historical Provider, MD   sitaGLIPtin-metFORMIN (JANUMET XR)  MG TB24 tablet Take 1 tablet by mouth 2 times daily. Historical Provider, MD   ezetimibe-simvastatin (VYTORIN) 10-40 MG per tablet Take 1 tablet by mouth nightly. Historical Provider, MD   losartan-hydrochlorothiazide (HYZAAR) 100-25 MG per tablet Take 1 tablet by mouth daily. Historical Provider, MD       Current medications:    No current facility-administered medications for this visit. No current outpatient medications on file. Facility-Administered Medications Ordered in Other Visits   Medication Dose Route Frequency Provider Last Rate Last Admin    sodium chloride flush 0.9 % injection 10 mL  10 mL Intravenous 2 times per day Genevive Running, DO        sodium chloride flush 0.9 % injection 10 mL  10 mL Intravenous PRN Genevive Running, DO        0.9 % sodium chloride infusion  25 mL Intravenous PRN Genevive Running, DO        0.9 % sodium chloride infusion   Intravenous Continuous Genevive Running, DO        lactated ringers infusion   Intravenous Continuous Genevive Running,  mL/hr at 21 0704 New Bag at 21 0704       Allergies:     Allergies   Allergen Reactions    Latex Rash     Any latex / rubber products    Morphine Nausea And Vomiting       Problem List:  There is no problem list on file for this patient. Past Medical History:        Diagnosis Date    Branch esophagus     Diabetes mellitus ([address] 2008    Type 2    Esophageal lesion     H/O removal of cyst 2004    shin    Hx of blood clots spring 2008    L ankle break; clots in leg and PE    Hyperlipidemia     Hypertension     Pneumonia 3 WEEKS AGO     FINISHED ANTIBIOTICS ALREADY    Thyroid disease        Past Surgical History:        Procedure Laterality Date    COLONOSCOPY      COLONOSCOPY N/A 5/8/2019    COLONOSCOPY performed by Dana Zhang MD at [address] [facility], DIAGNOSTIC  11/07/13    EGD with Ablation    FRACTURE SURGERY Left spring 2006    ankle-leg clot & PE    UPPER GASTROINTESTINAL ENDOSCOPY N/A 12/19/2018    EGD BIOPSY  (Gastric for H. Pylori) performed by Dana Zhang MD at [address] [place] [address] 12/20/2019    EGD BIOPSY performed by Dana Zhang MD at [address] [place] [address] 12/23/2020    EGD BIOPSY performed by Dana Zhang MD at [address] [place] [address] N/A 1/13/2021    EGD BIOPSY performed by Dana Zhang MD at [address] [place] [address] 4/6/2021    EGD BIOPSY performed by Dana Zhang MD at [address] [place] [address] N/A 5/26/2021    ESOPHAGOGASTRODUODENOSCOPY WITH RADIOFREQUENCY ABLATION performed by Dana Zhang MD at [address] [place] [address] 5/26/2021    EGD BIOPSY performed by Dana Zhang MD at [address] History:    Social History     Tobacco Use    Smoking status: Never Smoker    Smokeless tobacco: Never Used   Substance Use Topics    Alcohol use: Yes     Comment: very occasional                                Counseling given: Not Answered      Vital Signs (Current): There were no vitals filed for this visit. BP Readings from Last 3 Encounters:   07/30/21 117/69   05/26/21 103/66   05/26/21 (!) 87/53       NPO Status:                                                                                 BMI:   Wt Readings from Last 3 Encounters:   07/30/21 184 lb (83.5 kg)   05/26/21 184 lb (83.5 kg)   04/06/21 183 lb (83 kg)     There is no height or weight on file to calculate BMI.    CBC: No results found for: WBC, RBC, HGB, HCT, MCV, RDW, PLT    CMP:   Lab Results   Component Value Date     02/28/2014    K 4.3 02/28/2014     02/28/2014    CO2 29 02/28/2014    BUN 14 02/28/2014    CREATININE 0.97 02/28/2014    GFRAA >60 02/28/2014    LABGLOM >60 02/28/2014    GLUCOSE 108 02/28/2014    CALCIUM 9.7 02/28/2014       POC Tests:   Recent Labs     07/30/21  0701   POCGLU 114*       Coags: No results found for: PROTIME, INR, APTT    HCG (If Applicable): No results found for: PREGTESTUR, PREGSERUM, HCG, HCGQUANT     ABGs: No results found for: PHART, PO2ART, JMR0ROE, KQD0PSW, BEART, E1APBCPP     Type & Screen (If Applicable):  No results found for: LABABO, LABRH    Drug/Infectious Status (If Applicable):  No results found for: HIV, HEPCAB    COVID-19 Screening (If Applicable):   Lab Results   Component Value Date    COVID19 Not Detected 05/21/2021           Anesthesia Evaluation  Patient summary reviewed and Nursing notes reviewed no history of anesthetic complications:   Airway: Mallampati: II  TM distance: >3 FB   Neck ROM: full  Mouth opening: > = 3 FB Dental: normal exam         Pulmonary:normal exam  breath sounds clear to auscultation      (-) pneumonia                           Cardiovascular:Negative CV ROS  Exercise tolerance: good (>4 METS),   (+) hypertension: mild,         Rhythm: regular  Rate: normal           Beta Blocker:  Not on Beta Blocker         Neuro/Psych:   Negative Neuro/Psych ROS              GI/Hepatic/Renal:            ROS comment: Branch's esophagus .   Endo/Other:    (+) DiabetesType II DM, well controlled, , .                 Abdominal:       Abdomen: soft. Vascular: negative vascular ROS. Other Findings:               Anesthesia Plan      MAC     ASA 2       Induction: intravenous. Anesthetic plan and risks discussed with patient. Plan discussed with CRNA.                   Dhaval DO Jory   7/30/2021

## 2021-10-07 ENCOUNTER — ANESTHESIA EVENT (OUTPATIENT)
Dept: ENDOSCOPY | Age: 66
End: 2021-10-07
Payer: COMMERCIAL

## 2021-10-08 ENCOUNTER — ANESTHESIA (OUTPATIENT)
Dept: ENDOSCOPY | Age: 66
End: 2021-10-08
Payer: COMMERCIAL

## 2021-10-08 ENCOUNTER — HOSPITAL ENCOUNTER (OUTPATIENT)
Age: 66
Setting detail: OUTPATIENT SURGERY
Discharge: HOME | End: 2021-10-08
Attending: INTERNAL MEDICINE | Admitting: INTERNAL MEDICINE
Payer: COMMERCIAL

## 2021-10-08 VITALS
SYSTOLIC BLOOD PRESSURE: 94 MMHG | DIASTOLIC BLOOD PRESSURE: 57 MMHG | BODY MASS INDEX: 24.39 KG/M2 | HEIGHT: 73 IN | OXYGEN SATURATION: 97 % | TEMPERATURE: 98.7 F | RESPIRATION RATE: 18 BRPM | HEART RATE: 56 BPM | WEIGHT: 184 LBS

## 2021-10-08 VITALS — SYSTOLIC BLOOD PRESSURE: 92 MMHG | OXYGEN SATURATION: 99 % | DIASTOLIC BLOOD PRESSURE: 57 MMHG

## 2021-10-08 DIAGNOSIS — K22.2 STRICTURE OF ESOPHAGUS: ICD-10-CM

## 2021-10-08 LAB
GLUCOSE BLD-MCNC: 122 MG/DL (ref 70–99)
PERFORMED ON: ABNORMAL

## 2021-10-08 PROCEDURE — 7100000011 HC PHASE II RECOVERY - ADDTL 15 MIN: Performed by: INTERNAL MEDICINE

## 2021-10-08 PROCEDURE — 88305 TISSUE EXAM BY PATHOLOGIST: CPT

## 2021-10-08 PROCEDURE — 2580000003 HC RX 258: Performed by: ANESTHESIOLOGY

## 2021-10-08 PROCEDURE — 2580000003 HC RX 258: Performed by: NURSE ANESTHETIST, CERTIFIED REGISTERED

## 2021-10-08 PROCEDURE — 6360000002 HC RX W HCPCS: Performed by: NURSE ANESTHETIST, CERTIFIED REGISTERED

## 2021-10-08 PROCEDURE — 2709999900 HC NON-CHARGEABLE SUPPLY: Performed by: INTERNAL MEDICINE

## 2021-10-08 PROCEDURE — 7100000010 HC PHASE II RECOVERY - FIRST 15 MIN: Performed by: INTERNAL MEDICINE

## 2021-10-08 PROCEDURE — 3700000000 HC ANESTHESIA ATTENDED CARE: Performed by: INTERNAL MEDICINE

## 2021-10-08 PROCEDURE — 2500000003 HC RX 250 WO HCPCS: Performed by: NURSE ANESTHETIST, CERTIFIED REGISTERED

## 2021-10-08 PROCEDURE — 3609012400 HC EGD TRANSORAL BIOPSY SINGLE/MULTIPLE: Performed by: INTERNAL MEDICINE

## 2021-10-08 PROCEDURE — 3700000001 HC ADD 15 MINUTES (ANESTHESIA): Performed by: INTERNAL MEDICINE

## 2021-10-08 RX ORDER — LIDOCAINE HYDROCHLORIDE 20 MG/ML
INJECTION, SOLUTION EPIDURAL; INFILTRATION; INTRACAUDAL; PERINEURAL PRN
Status: DISCONTINUED | OUTPATIENT
Start: 2021-10-08 | End: 2021-10-08 | Stop reason: SDUPTHER

## 2021-10-08 RX ORDER — PROPOFOL 10 MG/ML
INJECTION, EMULSION INTRAVENOUS PRN
Status: DISCONTINUED | OUTPATIENT
Start: 2021-10-08 | End: 2021-10-08 | Stop reason: SDUPTHER

## 2021-10-08 RX ORDER — DIPHENHYDRAMINE HYDROCHLORIDE 50 MG/ML
12.5 INJECTION INTRAMUSCULAR; INTRAVENOUS
Status: DISCONTINUED | OUTPATIENT
Start: 2021-10-08 | End: 2021-10-08 | Stop reason: HOSPADM

## 2021-10-08 RX ORDER — LABETALOL HYDROCHLORIDE 5 MG/ML
5 INJECTION, SOLUTION INTRAVENOUS EVERY 10 MIN PRN
Status: DISCONTINUED | OUTPATIENT
Start: 2021-10-08 | End: 2021-10-08 | Stop reason: HOSPADM

## 2021-10-08 RX ORDER — MORPHINE SULFATE 4 MG/ML
1 INJECTION, SOLUTION INTRAMUSCULAR; INTRAVENOUS EVERY 5 MIN PRN
Status: DISCONTINUED | OUTPATIENT
Start: 2021-10-08 | End: 2021-10-08 | Stop reason: HOSPADM

## 2021-10-08 RX ORDER — OXYCODONE HYDROCHLORIDE 5 MG/1
10 TABLET ORAL PRN
Status: DISCONTINUED | OUTPATIENT
Start: 2021-10-08 | End: 2021-10-08 | Stop reason: HOSPADM

## 2021-10-08 RX ORDER — OXYCODONE HYDROCHLORIDE 5 MG/1
5 TABLET ORAL PRN
Status: DISCONTINUED | OUTPATIENT
Start: 2021-10-08 | End: 2021-10-08 | Stop reason: HOSPADM

## 2021-10-08 RX ORDER — HYDRALAZINE HYDROCHLORIDE 20 MG/ML
5 INJECTION INTRAMUSCULAR; INTRAVENOUS EVERY 10 MIN PRN
Status: DISCONTINUED | OUTPATIENT
Start: 2021-10-08 | End: 2021-10-08 | Stop reason: HOSPADM

## 2021-10-08 RX ORDER — SODIUM CHLORIDE, SODIUM LACTATE, POTASSIUM CHLORIDE, CALCIUM CHLORIDE 600; 310; 30; 20 MG/100ML; MG/100ML; MG/100ML; MG/100ML
INJECTION, SOLUTION INTRAVENOUS CONTINUOUS PRN
Status: DISCONTINUED | OUTPATIENT
Start: 2021-10-08 | End: 2021-10-08 | Stop reason: SDUPTHER

## 2021-10-08 RX ORDER — MEPERIDINE HYDROCHLORIDE 25 MG/ML
12.5 INJECTION INTRAMUSCULAR; INTRAVENOUS; SUBCUTANEOUS EVERY 5 MIN PRN
Status: DISCONTINUED | OUTPATIENT
Start: 2021-10-08 | End: 2021-10-08 | Stop reason: HOSPADM

## 2021-10-08 RX ORDER — PROMETHAZINE HYDROCHLORIDE 25 MG/ML
6.25 INJECTION, SOLUTION INTRAMUSCULAR; INTRAVENOUS
Status: DISCONTINUED | OUTPATIENT
Start: 2021-10-08 | End: 2021-10-08 | Stop reason: HOSPADM

## 2021-10-08 RX ORDER — SODIUM CHLORIDE, SODIUM LACTATE, POTASSIUM CHLORIDE, CALCIUM CHLORIDE 600; 310; 30; 20 MG/100ML; MG/100ML; MG/100ML; MG/100ML
INJECTION, SOLUTION INTRAVENOUS CONTINUOUS
Status: DISCONTINUED | OUTPATIENT
Start: 2021-10-08 | End: 2021-10-08 | Stop reason: HOSPADM

## 2021-10-08 RX ORDER — METOCLOPRAMIDE HYDROCHLORIDE 5 MG/ML
10 INJECTION INTRAMUSCULAR; INTRAVENOUS
Status: DISCONTINUED | OUTPATIENT
Start: 2021-10-08 | End: 2021-10-08 | Stop reason: HOSPADM

## 2021-10-08 RX ADMIN — SODIUM CHLORIDE, SODIUM LACTATE, POTASSIUM CHLORIDE, AND CALCIUM CHLORIDE: .6; .31; .03; .02 INJECTION, SOLUTION INTRAVENOUS at 07:45

## 2021-10-08 RX ADMIN — PROPOFOL 40 MG: 10 INJECTION, EMULSION INTRAVENOUS at 08:00

## 2021-10-08 RX ADMIN — PROPOFOL 40 MG: 10 INJECTION, EMULSION INTRAVENOUS at 08:03

## 2021-10-08 RX ADMIN — LIDOCAINE HYDROCHLORIDE 50 MG: 20 INJECTION, SOLUTION EPIDURAL; INFILTRATION; INTRACAUDAL; PERINEURAL at 07:57

## 2021-10-08 RX ADMIN — PROPOFOL 20 MG: 10 INJECTION, EMULSION INTRAVENOUS at 08:06

## 2021-10-08 RX ADMIN — PROPOFOL 40 MG: 10 INJECTION, EMULSION INTRAVENOUS at 07:58

## 2021-10-08 RX ADMIN — SODIUM CHLORIDE, POTASSIUM CHLORIDE, SODIUM LACTATE AND CALCIUM CHLORIDE: 600; 310; 30; 20 INJECTION, SOLUTION INTRAVENOUS at 07:07

## 2021-10-08 RX ADMIN — PROPOFOL 40 MG: 10 INJECTION, EMULSION INTRAVENOUS at 07:57

## 2021-10-08 ASSESSMENT — PULMONARY FUNCTION TESTS
PIF_VALUE: 1
PIF_VALUE: 0
PIF_VALUE: 0
PIF_VALUE: 1
PIF_VALUE: 0
PIF_VALUE: 0
PIF_VALUE: 1
PIF_VALUE: 0
PIF_VALUE: 1
PIF_VALUE: 0
PIF_VALUE: 0
PIF_VALUE: 1
PIF_VALUE: 0
PIF_VALUE: 0
PIF_VALUE: 1
PIF_VALUE: 1
PIF_VALUE: 0
PIF_VALUE: 0
PIF_VALUE: 1
PIF_VALUE: 0
PIF_VALUE: 1
PIF_VALUE: 1
PIF_VALUE: 0
PIF_VALUE: 1
PIF_VALUE: 0

## 2021-10-08 ASSESSMENT — PAIN - FUNCTIONAL ASSESSMENT: PAIN_FUNCTIONAL_ASSESSMENT: 0-10

## 2021-10-08 ASSESSMENT — PAIN SCALES - GENERAL: PAINLEVEL_OUTOF10: 0

## 2021-10-08 NOTE — ANESTHESIA POSTPROCEDURE EVALUATION
Department of Anesthesiology  Postprocedure Note    Patient: Angelina Clark  MRN: [id number]  YOB: 1955  Date of evaluation: 10/8/2021  Time:  8:03 AM     Procedure Summary     Date: 10/08/21 Room / Location: [address]    Anesthesia Start: 0745 Anesthesia Stop:     Procedure: EGD BIOPSY (N/A ) Diagnosis:       Stricture of esophagus      (Barretts Esophagus)    Surgeons: Fabian Cho MD Responsible Provider:     Anesthesia Type: general ASA Status: 3          Anesthesia Type: No value filed. Joe Phase I: Joe Score: 10    Joe Phase II:      Last vitals: Reviewed and per EMR flowsheets.        Anesthesia Post Evaluation    Patient location during evaluation: PACU  Patient participation: complete - patient participated  Level of consciousness: awake and alert  Pain score: 0  Airway patency: patent  Nausea & Vomiting: no nausea and no vomiting  Complications: no  Cardiovascular status: hemodynamically stable  Respiratory status: acceptable  Hydration status: euvolemic

## 2021-10-08 NOTE — ANESTHESIA PRE PROCEDURE
MD        diphenhydrAMINE (BENADRYL) injection 12.5 mg  12.5 mg IntraVENous Once PRN Jessenia Gruber MD        metoclopramide [facility]) injection 10 mg  10 mg IntraVENous Once PRN Jessenia Gruber MD        promethazine [facility]) injection 6.25 mg  6.25 mg IntraVENous Once PRN Jessenia Gruber MD        labetalol (NORMODYNE;TRANDATE) injection 5 mg  5 mg IntraVENous Q10 Min PRN Jessenia Gruber MD        hydrALAZINE (APRESOLINE) injection 5 mg  5 mg IntraVENous Q10 Min PRN Jessenia Gruber MD        meperidine (DEMEROL) injection 12.5 mg  12.5 mg IntraVENous Q5 Min PRN Jessenia Gruber MD           Allergies: Allergies   Allergen Reactions    Latex Rash     Any latex / rubber products    Morphine Nausea And Vomiting       Problem List:  There is no problem list on file for this patient.       Past Medical History:        Diagnosis Date    Branch esophagus     Diabetes mellitus ([facility] [address] 2008    Type 2    Esophageal lesion     H/O removal of cyst 2004    shin    Hx of blood clots spring 2008    L ankle break; clots in leg and PE    Hyperlipidemia     Hypertension     Pneumonia 3 WEEKS AGO     FINISHED ANTIBIOTICS ALREADY    Thyroid disease        Past Surgical History:        Procedure Laterality Date    COLONOSCOPY      COLONOSCOPY N/A 5/8/2019    COLONOSCOPY performed by Yolanda Littlejohn MD at [address] [facility]  11/07/13    EGD with Ablation    FRACTURE SURGERY Left spring 2006    ankle-leg clot & PE    UPPER GASTROINTESTINAL ENDOSCOPY N/A 12/19/2018    EGD BIOPSY  (Gastric for H. Pylori) performed by Yolanda Littlejohn MD at [facility] 19 12/20/2019    EGD BIOPSY performed by Yolanda Littlejohn MD at [facility] 19 12/23/2020    EGD BIOPSY performed by Yolanda Littlejohn MD at [facility] 19 1/13/2021    EGD BIOPSY performed by Yolanda Littlejohn MD at [place] [address]  UPPER GASTROINTESTINAL ENDOSCOPY N/A 4/6/2021    EGD BIOPSY performed by Fabian Cho MD at [address] N/A 5/26/2021    ESOPHAGOGASTRODUODENOSCOPY WITH RADIOFREQUENCY ABLATION performed by Fabian Cho MD at [address] 5/26/2021    [facility] [address] [facility] N/A 7/30/2021    ESOPHAGOGASTRODUODENOSCOPY RADIOFREQUENCY ABLATION performed [facility] [address] [facility] N/A 7/30/2021    EGD BIOPSY performed by Fabian Cho MD at [address] History:    Social History     Tobacco Use    Smoking status: Never Smoker    Smokeless tobacco: Never Used   Substance Use Topics    Alcohol use: Yes     Comment: very occasional                                Counseling given: Not Answered      Vital Signs (Current):   Vitals:    10/08/21 0646   BP: 131/72   Pulse: 69   Resp: 18   Temp: 97.7 °F (36.5 °C)   TempSrc: Temporal   SpO2: 100%   Weight: 184 lb (83.5 kg)   Height: 6' 1\" (1.854 m)                                              BP Readings from Last 3 Encounters:   10/08/21 131/72   07/30/21 103/63   07/30/21 106/71       NPO Status: Time of last liquid consumption: 1930                        Time of last solid consumption: 1930                        Date of last liquid consumption: 10/07/21                        Date of last solid food consumption: 10/07/21    BMI:   Wt Readings from Last 3 Encounters:   10/08/21 184 lb (83.5 kg)   07/30/21 184 lb (83.5 kg)   05/26/21 184 lb (83.5 kg)     Body mass index is 24.28 kg/m².     CBC: No results found for: WBC, RBC, HGB, HCT, MCV, RDW, PLT    CMP:   Lab Results   Component Value Date     02/28/2014    K 4.3 02/28/2014     02/28/2014    CO2 29 02/28/2014    BUN 14 02/28/2014    CREATININE 0.97 02/28/2014    GFRAA >60 02/28/2014    LABGLOM >60 02/28/2014 GLUCOSE 108 02/28/2014    CALCIUM 9.7 02/28/2014       POC Tests:   Recent Labs     10/08/21  0709   POCGLU 122*       Coags: No results found for: PROTIME, INR, APTT    HCG (If Applicable): No results found for: PREGTESTUR, PREGSERUM, HCG, HCGQUANT     ABGs: No results found for: PHART, PO2ART, YEB5XMP, UJA1DQE, BEART, V9WZBYRN     Type & Screen (If Applicable):  No results found for: LABABO, LABRH    Drug/Infectious Status (If Applicable):  No results found for: HIV, HEPCAB    COVID-19 Screening (If Applicable):   Lab Results   Component Value Date    COVID19 Not Detected 05/21/2021           Anesthesia Evaluation  Patient summary reviewed and Nursing notes reviewed no history of anesthetic complications:   Airway: Mallampati: II  TM distance: >3 FB   Neck ROM: full  Mouth opening: > = 3 FB Dental:          Pulmonary:   (+) pneumonia:                             Cardiovascular:    (+) hypertension:, hyperlipidemia                  Neuro/Psych:   Negative Neuro/Psych ROS              GI/Hepatic/Renal:            ROS comment: Branch's esophagus. Endo/Other:    (+) DiabetesType II DM, , .                 Abdominal:             Vascular:   + PE (s/p ankle fracture). Other Findings:             Anesthesia Plan      general     ASA 1    (77-year-old male presents for esophagogastroduodenoscopy. Plan general anesthesia with ASA standard monitors. Questions answered. Patient agreeable with anesthetic plan.  )  Induction: intravenous. Anesthetic plan and risks discussed with patient. Plan discussed with CRNA.     Attending anesthesiologist reviewed and agrees with Vick Coppola MD   10/8/2021

## 2021-10-08 NOTE — H&P
.  History and Physical / Pre-Sedation Assessment    Patient:  Manuel Rojas   :   1955     Intended Procedure:  egd    HPI: fu dysplastic epithelium treated with laser ablation    Past Medical History:   has a past medical history of Branch esophagus, Diabetes mellitus ([facility] [address] Esophageal lesion, H/O removal of cyst, Hx of blood clots, Hyperlipidemia, Hypertension, Pneumonia, and Thyroid disease. Past Surgical History:   has a past surgical history that includes Colonoscopy; Endoscopy, colon, diagnostic (13); fracture surgery (Left, spring 2006); Upper gastrointestinal endoscopy (N/A, 2018); Colonoscopy (N/A, 2019); Upper gastrointestinal endoscopy (N/A, 2019); Upper gastrointestinal endoscopy (N/A, 2020); Upper gastrointestinal endoscopy (N/A, 2021); Upper gastrointestinal endoscopy (N/A, 2021); Upper gastrointestinal endoscopy (N/A, 2021); Upper gastrointestinal endoscopy (N/A, 2021); Upper gastrointestinal endoscopy (N/A, 2021); and Upper gastrointestinal endoscopy (N/A, 2021). Medications:  Prior to Admission medications    Medication Sig Start Date End Date Taking? Authorizing Provider   Lansoprazole (PREVACID PO) Take by mouth daily   Yes Historical Provider, MD   levothyroxine (SYNTHROID) 125 MCG tablet Take 125 mcg by mouth Daily KIRILL   Yes Historical Provider, MD   Multiple Vitamins-Minerals (THERAPEUTIC MULTIVITAMIN-MINERALS) tablet Take 1 tablet by mouth daily. Yes Historical Provider, MD   sitaGLIPtin-metFORMIN (JANUMET XR)  MG TB24 tablet Take 1 tablet by mouth 2 times daily. Yes Historical Provider, MD   ezetimibe-simvastatin (VYTORIN) 10-40 MG per tablet Take 1 tablet by mouth nightly. Yes Historical Provider, MD   losartan-hydrochlorothiazide (HYZAAR) 100-25 MG per tablet Take 1 tablet by mouth daily. Yes Historical Provider, MD       Family History:  family history is not on file.     Social History:   reports that he has never smoked. He has never used smokeless tobacco. He reports current alcohol use. He reports that he does not use drugs. Allergies:  Latex and Morphine    ROS:  twelve point system review was unremarkable except for above noted history. Nurses notes reviewed and agreed. Medications reviewed    Physical Exam:  Vital Signs: /72   Pulse 69   Temp 97.7 °F (36.5 °C) (Temporal)   Resp 18   Ht 6' 1\" (1.854 m)   Wt 184 lb (83.5 kg)   SpO2 100%   BMI 24.28 kg/m²    Skin: normal  HEENT: normal  Neck: supple. No adenopathy. No thyromegaly. No JVD. Pulmonary:Normal  Cardiac:Normal  Abdomen:Normal  MS: normal  Neuro: normal  Ext: no edema. Pulses normal    Pre-Procedure Assessment / Plan:  ASA 2 - Patient with mild systemic disease with no functional limitations  Mallampati Airway Assessment:  Mallampati Class I - (soft palate, fauces, uvula & anterior/posterior tonsillar pillars are visible)  Level of Sedation Plan: Moderate sedation  Post Procedure plan: Return to same level of care    I assessed the patient and find that the patient is in satisfactory condition to proceed with the planned procedure and sedation plan. I have explained the risk, benefits, and alternatives to the procedure. The patient understands and agrees to proceed.   Yes    Pam Portillo MD  7:55 AM 10/8/2021

## 2021-10-08 NOTE — PROGRESS NOTES
4866- Pt arrived from endoscopy s/p EGD with biopsy. Pt drowsy, but arousable, wife at bedside. 200- D/C instructions reviewed with pt and pt's wife, copy provided. IV removed, pt dressed. Waiting for Dr. Lilian Barragan to speak with pt.  9986- pt stated he was ready to leave without speaking with dr. Lilian Barragan. Ambulatory Surgery/Procedure Discharge Note    Vitals:    10/08/21 0839   BP: (!) 94/57   Pulse: 56   Resp: 18   Temp: 98.7 °F (37.1 °C)   SpO2: 97%       In: 300 [I.V.:300]  Out: -     Restroom use offered before discharge. Yes    Pain assessment:  none  Pain Level: 0        Patient discharged to home/self care.  Patient discharged via wheel chair by transporter to waiting family/S.O.       10/8/2021 9:09 AM     Malka Cosme RN

## 2021-10-08 NOTE — OP NOTE
[address], [address]                                OPERATIVE REPORT    PATIENT NAME: Connie Pleitez                     :        1955  MED REC NO:   [id number]                          ROOM:  ACCOUNT NO:   [de-identified]                           ADMIT DATE: 10/08/2021  PROVIDER:     Gary Fowler MD    DATE OF PROCEDURE:  10/08/2021    SURGEON:  Gary Fowler MD.    INDICATION FOR PROCEDURE:  Followup dysplastic epithelium in the gastric  site of Branch's esophagus. Status post radiofrequency ablation. The patient is today having an endoscopy and numerous biopsies from this  region. DESCRIPTION OF THE PROCEDURE:  With the patient in the left lateral  position and after IV Diprivan, the Olympus video endoscope was  introduced into the esophagus and advanced toward the GE junction. Hiatus hernia was identified and several biopsies were obtained from GE  junction and the gastric site in the hernia sac. Stomach and duodenum  were normal.  Scope was then removed without complication. IMPRESSION:  1. Hiatus hernia. 2.  Status post RFA for dysplastic epithelium as described. Biopsy obtained. We will await pathology findings and follow in the  office. EBL:  None.         Eric Max MD    D: 10/08/2021 8:36:38       T: 10/08/2021 10:13:40     [email or web address]  Job#: [id number]     Doc#: [id number]    CC:

## 2021-11-03 ENCOUNTER — ANESTHESIA EVENT (OUTPATIENT)
Dept: ENDOSCOPY | Age: 66
End: 2021-11-03
Payer: COMMERCIAL

## 2021-11-04 ENCOUNTER — ANESTHESIA (OUTPATIENT)
Dept: ENDOSCOPY | Age: 66
End: 2021-11-04
Payer: COMMERCIAL

## 2021-11-04 ENCOUNTER — HOSPITAL ENCOUNTER (OUTPATIENT)
Age: 66
Setting detail: OUTPATIENT SURGERY
Discharge: HOME | End: 2021-11-04
Attending: INTERNAL MEDICINE | Admitting: INTERNAL MEDICINE
Payer: COMMERCIAL

## 2021-11-04 VITALS
HEIGHT: 73 IN | DIASTOLIC BLOOD PRESSURE: 77 MMHG | WEIGHT: 184 LBS | HEART RATE: 60 BPM | OXYGEN SATURATION: 98 % | SYSTOLIC BLOOD PRESSURE: 117 MMHG | TEMPERATURE: 97.6 F | RESPIRATION RATE: 16 BRPM | BODY MASS INDEX: 24.39 KG/M2

## 2021-11-04 VITALS
RESPIRATION RATE: 11 BRPM | OXYGEN SATURATION: 98 % | SYSTOLIC BLOOD PRESSURE: 94 MMHG | DIASTOLIC BLOOD PRESSURE: 52 MMHG

## 2021-11-04 LAB
GLUCOSE BLD-MCNC: 128 MG/DL (ref 70–99)
PERFORMED ON: ABNORMAL

## 2021-11-04 PROCEDURE — C1888 ENDOVAS NON-CARDIAC ABL CATH: HCPCS | Performed by: INTERNAL MEDICINE

## 2021-11-04 PROCEDURE — 6360000002 HC RX W HCPCS: Performed by: NURSE ANESTHETIST, CERTIFIED REGISTERED

## 2021-11-04 PROCEDURE — 3609013200 HC EGD W/ ABLATION: Performed by: INTERNAL MEDICINE

## 2021-11-04 PROCEDURE — 7100000011 HC PHASE II RECOVERY - ADDTL 15 MIN: Performed by: INTERNAL MEDICINE

## 2021-11-04 PROCEDURE — 3700000001 HC ADD 15 MINUTES (ANESTHESIA): Performed by: INTERNAL MEDICINE

## 2021-11-04 PROCEDURE — 2580000003 HC RX 258: Performed by: ANESTHESIOLOGY

## 2021-11-04 PROCEDURE — 7100000010 HC PHASE II RECOVERY - FIRST 15 MIN: Performed by: INTERNAL MEDICINE

## 2021-11-04 PROCEDURE — 3700000000 HC ANESTHESIA ATTENDED CARE: Performed by: INTERNAL MEDICINE

## 2021-11-04 PROCEDURE — 6360000002 HC RX W HCPCS: Performed by: INTERNAL MEDICINE

## 2021-11-04 PROCEDURE — 2709999900 HC NON-CHARGEABLE SUPPLY: Performed by: INTERNAL MEDICINE

## 2021-11-04 RX ORDER — PROPOFOL 10 MG/ML
INJECTION, EMULSION INTRAVENOUS PRN
Status: DISCONTINUED | OUTPATIENT
Start: 2021-11-04 | End: 2021-11-04 | Stop reason: SDUPTHER

## 2021-11-04 RX ORDER — ACETYLCYSTEINE 200 MG/ML
SOLUTION ORAL; RESPIRATORY (INHALATION) PRN
Status: DISCONTINUED | OUTPATIENT
Start: 2021-11-04 | End: 2021-11-04 | Stop reason: ALTCHOICE

## 2021-11-04 RX ORDER — MULTIVITAMIN WITH FOLIC ACID 400 MCG
1 TABLET ORAL
COMMUNITY
Start: 2021-10-16

## 2021-11-04 RX ORDER — TAZAROTENE 1 MG/G
CREAM TOPICAL
Status: ON HOLD | COMMUNITY
Start: 2021-11-01 | End: 2021-11-04 | Stop reason: ALTCHOICE

## 2021-11-04 RX ORDER — SODIUM CHLORIDE, SODIUM LACTATE, POTASSIUM CHLORIDE, CALCIUM CHLORIDE 600; 310; 30; 20 MG/100ML; MG/100ML; MG/100ML; MG/100ML
INJECTION, SOLUTION INTRAVENOUS CONTINUOUS
Status: DISCONTINUED | OUTPATIENT
Start: 2021-11-04 | End: 2021-11-04 | Stop reason: HOSPADM

## 2021-11-04 RX ADMIN — PROPOFOL 50 MG: 10 INJECTION, EMULSION INTRAVENOUS at 08:17

## 2021-11-04 RX ADMIN — PROPOFOL 100 MG: 10 INJECTION, EMULSION INTRAVENOUS at 08:01

## 2021-11-04 RX ADMIN — PROPOFOL 50 MG: 10 INJECTION, EMULSION INTRAVENOUS at 08:08

## 2021-11-04 RX ADMIN — SODIUM CHLORIDE, POTASSIUM CHLORIDE, SODIUM LACTATE AND CALCIUM CHLORIDE: 600; 310; 30; 20 INJECTION, SOLUTION INTRAVENOUS at 07:19

## 2021-11-04 RX ADMIN — PROPOFOL 50 MG: 10 INJECTION, EMULSION INTRAVENOUS at 08:19

## 2021-11-04 RX ADMIN — PROPOFOL 50 MG: 10 INJECTION, EMULSION INTRAVENOUS at 08:05

## 2021-11-04 ASSESSMENT — PULMONARY FUNCTION TESTS
PIF_VALUE: 0
PIF_VALUE: 1
PIF_VALUE: 0
PIF_VALUE: 1
PIF_VALUE: 0
PIF_VALUE: 1
PIF_VALUE: 0
PIF_VALUE: 1
PIF_VALUE: 0
PIF_VALUE: 0
PIF_VALUE: 1
PIF_VALUE: 0

## 2021-11-04 ASSESSMENT — PAIN - FUNCTIONAL ASSESSMENT
PAIN_FUNCTIONAL_ASSESSMENT: 0-10

## 2021-11-04 ASSESSMENT — PAIN SCALES - WONG BAKER: WONGBAKER_NUMERICALRESPONSE: 0

## 2021-11-04 NOTE — ANESTHESIA PRE PROCEDURE
Department of Anesthesiology  Preprocedure Note       Name:  Yifan Jacques   Age:  61 y.o.  :  1955                                          MRN:  [id number]         Date:  2021      Surgeon: Chandni Garza):  Tosha Genao MD    Procedure: Procedure(s):  ESOPHAGOGASTRODUODENOSCOPY WITH RADIOFREQUENCY ABLATION    Medications prior to admission:   Prior to Admission medications    Medication Sig Start Date End Date Taking? Authorizing Provider   Lansoprazole (PREVACID PO) Take by mouth daily    Historical Provider, MD   levothyroxine (SYNTHROID) 125 MCG tablet Take 125 mcg by mouth Daily KIRILL    Historical Provider, MD   Multiple Vitamins-Minerals (THERAPEUTIC MULTIVITAMIN-MINERALS) tablet Take 1 tablet by mouth daily. Historical Provider, MD   sitaGLIPtin-metFORMIN (JANUMET XR)  MG TB24 tablet Take 1 tablet by mouth 2 times daily. Historical Provider, MD   ezetimibe-simvastatin (VYTORIN) 10-40 MG per tablet Take 1 tablet by mouth nightly. Historical Provider, MD   losartan-hydrochlorothiazide (HYZAAR) 100-25 MG per tablet Take 1 tablet by mouth daily. Historical Provider, MD       Current medications:    No current facility-administered medications for this encounter. Allergies: Allergies   Allergen Reactions    Latex Rash     Any latex / rubber products    Morphine Nausea And Vomiting       Problem List:  There is no problem list on file for this patient.       Past Medical History:        Diagnosis Date    Branch esophagus     Diabetes mellitus ([facility] [address]     Type 2    Esophageal lesion     H/O removal of cyst     shin    Hx of blood clots spring 2008    L ankle break; clots in leg and PE    Hyperlipidemia     Hypertension     Pneumonia 3 WEEKS AGO     FINISHED ANTIBIOTICS ALREADY    Thyroid disease        Past Surgical History:        Procedure Laterality Date    COLONOSCOPY      COLONOSCOPY N/A 2019    COLONOSCOPY performed by Tosha Genao MD at [address] ENDOSCOPY    ENDOSCOPY, COLON, DIAGNOSTIC  11/07/13    EGD with Ablation    FRACTURE SURGERY Left spring 2006    ankle-leg clot & PE    UPPER GASTROINTESTINAL ENDOSCOPY N/A 12/19/2018    EGD BIOPSY  (Gastric for H. Pylori) performed by Chapis Hatch MD at [address] [facility] 12/20/2019    EGD BIOPSY performed by Chapis Hatch MD at [address] [facility] 12/23/2020    EGD BIOPSY performed by Chapis Hatch MD at [address] N/A 1/13/2021    EGD BIOPSY performed by Chapis Hatch MD at [address] [facility] 4/6/2021    EGD BIOPSY performed by Chapis Hatch MD at [address] N/A 5/26/2021    ESOPHAGOGASTRODUODENOSCOPY WITH RADIOFREQUENCY ABLATION performed by Chapis Hatch MD at [address] [facility] 5/26/2021    EGD BIOPSY performed by Chapis Hatch MD at [address] N/A 7/30/2021    ESOPHAGOGASTRODUODENOSCOPY RADIOFREQUENCY ABLATION performed by Chapis Hatch MD at [address] [facility] 7/30/2021    EGD BIOPSY performed by Chapis Hatch MD at [address] N/A 10/8/2021    EGD BIOPSY performed by Chapis Hatch MD at [address] [facility] History:    Social History     Tobacco Use    Smoking status: Never Smoker    Smokeless tobacco: Never Used   Substance Use Topics    Alcohol use: Yes     Comment: very occasional                                Counseling given: Not Answered      Vital Signs (Current): There were no vitals filed for this visit.                                            BP Readings from Last 3 Encounters:   10/08/21 (!) 92/57   10/08/21 (!) 94/57   07/30/21 103/63       NPO Status:                                                                                 BMI:   Wt Readings from Last 3 Encounters:   10/08/21 184 lb (83.5 kg)   07/30/21 184 lb (83.5 kg)   05/26/21 184 lb (83.5 kg)     There is no height or weight on file to calculate BMI.    CBC: No results found for: WBC, RBC, HGB, HCT, MCV, RDW, PLT    CMP:   Lab Results   Component Value Date     02/28/2014    K 4.3 02/28/2014     02/28/2014    CO2 29 02/28/2014    BUN 14 02/28/2014    CREATININE 0.97 02/28/2014    GFRAA >60 02/28/2014    LABGLOM >60 02/28/2014    GLUCOSE 108 02/28/2014    CALCIUM 9.7 02/28/2014       POC Tests: No results for input(s): POCGLU, POCNA, POCK, POCCL, POCBUN, POCHEMO, POCHCT in the last 72 hours. Coags: No results found for: PROTIME, INR, APTT    HCG (If Applicable): No results found for: PREGTESTUR, PREGSERUM, HCG, HCGQUANT     ABGs: No results found for: PHART, PO2ART, MFA2WVE, RRG6YKT, BEART, R0LSJYFX     Type & Screen (If Applicable):  No results found for: LABABO, LABRH    Drug/Infectious Status (If Applicable):  No results found for: HIV, HEPCAB    COVID-19 Screening (If Applicable):   Lab Results   Component Value Date    COVID19 Not Detected 05/21/2021           Anesthesia Evaluation  Patient summary reviewed history of anesthetic complications:   Airway: Mallampati: II  TM distance: >3 FB   Neck ROM: full  Mouth opening: > = 3 FB Dental: normal exam         Pulmonary:                              Cardiovascular:    (+) hypertension:,                   Neuro/Psych:               GI/Hepatic/Renal:            ROS comment: Branch. Endo/Other:    (+) Diabetes, hypothyroidism::., .                 Abdominal:             Vascular: Other Findings:             Anesthesia Plan      general and MAC     ASA 2       Induction: intravenous. Anesthetic plan and risks discussed with patient.                       Ricky Chacko MD   11/4/2021

## 2021-11-04 NOTE — H&P
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 [address] Esophageal lesion, H/O removal of cyst, Hx of blood clots, Hyperlipidemia, Hypertension, Pneumonia, and Thyroid disease. Past Surgical History:   has a past surgical history that includes Colonoscopy; Endoscopy, colon, diagnostic (13); fracture surgery (Left, spring 2006); Upper gastrointestinal endoscopy (N/A, 2018); Colonoscopy (N/A, 2019); Upper gastrointestinal endoscopy (N/A, 2019); Upper gastrointestinal endoscopy (N/A, 2020); Upper gastrointestinal endoscopy (N/A, 2021); Upper gastrointestinal endoscopy (N/A, 2021); Upper gastrointestinal endoscopy (N/A, 2021); Upper gastrointestinal endoscopy (N/A, 2021); Upper gastrointestinal endoscopy (N/A, 2021); Upper gastrointestinal endoscopy (N/A, 2021); and Upper gastrointestinal endoscopy (N/A, 10/8/2021). Medications:  Prior to Admission medications    Medication Sig Start Date End Date Taking? Authorizing Provider   Multiple Vitamin (MULTIVITAMIN) tablet  10/16/21  Yes Historical Provider, MD   Lansoprazole (PREVACID PO) Take by mouth daily   Yes Historical Provider, MD   levothyroxine (SYNTHROID) 125 MCG tablet Take 125 mcg by mouth Daily KIRILL   Yes Historical Provider, MD   Multiple Vitamins-Minerals (THERAPEUTIC MULTIVITAMIN-MINERALS) tablet Take 1 tablet by mouth daily. Yes Historical Provider, MD   sitaGLIPtin-metFORMIN (JANUMET XR)  MG TB24 tablet Take 1 tablet by mouth 2 times daily. Yes Historical Provider, MD   ezetimibe-simvastatin (VYTORIN) 10-40 MG per tablet Take 1 tablet by mouth nightly.    Yes Historical Provider, MD   losartan-hydrochlorothiazide (HYZAAR) 100-25 MG per tablet Take 1 tablet by mouth daily. Yes Historical Provider, MD       Family History:  family history includes Cancer in his mother. Social History:   reports that he has never smoked. He has never used smokeless tobacco. He reports previous alcohol use. He reports that he does not use drugs. Allergies:  Latex and Morphine    ROS:  twelve point system review was unremarkable except for above noted history. Nurses notes reviewed and agreed. Medications reviewed    Physical Exam:  Vital Signs: /81   Pulse 77   Temp 97.3 °F (36.3 °C) (Tympanic)   Resp 16   Ht 6' 1\" (1.854 m)   Wt 184 lb (83.5 kg)   SpO2 100%   BMI 24.28 kg/m²    Skin: normal  HEENT: normal  Neck: supple. No adenopathy. No thyromegaly. No JVD. Pulmonary:Normal  Cardiac:Normal  Abdomen:Normal  MS: normal  Neuro: normal  Ext: no edema. Pulses normal    Pre-Procedure Assessment / Plan:  ASA 2 - Patient with mild systemic disease with no functional limitations  Mallampati Airway Assessment:  Mallampati Class II - (soft palate, fauces & uvula are visible)  Level of Sedation Plan: Moderate sedation  Post Procedure plan: Return to same level of care    I assessed the patient and find that the patient is in satisfactory condition to proceed with the planned procedure and sedation plan. I have explained the risk, benefits, and alternatives to the procedure. The patient understands and agrees to proceed.   Yes    Varinder Clay MD  7:51 AM 11/4/2021

## 2021-11-04 NOTE — ANESTHESIA POSTPROCEDURE EVALUATION
Department of Anesthesiology  Postprocedure Note    Patient: Nguyễn Figueroa  MRN: [id number]  YOB: 1955  Date of evaluation: 11/4/2021  Time:  9:08 AM     Procedure Summary     Date: 11/04/21 Room / Location: [address]    Anesthesia Start: 9302 Anesthesia Stop: 0825    Procedure: ESOPHAGOGASTRODUODENOSCOPY WITH RADIOFREQUENCY ABLATION (N/A ) Diagnosis:       Branch's esophagus without dysplasia      (Branch's esophagus without dysplasia [K22.70])    Surgeons: Melvin Wood MD Responsible Provider: Aliyah Gabriel MD    Anesthesia Type: general, MAC ASA Status: 2          Anesthesia Type: general, MAC    Joe Phase I: Joe Score: 10    Joe Phase II: Joe Score: 10    Last vitals: Reviewed and per EMR flowsheets.        Anesthesia Post Evaluation    Patient participation: complete - patient participated  Level of consciousness: awake  Airway patency: patent  Nausea & Vomiting: no nausea and no vomiting  Complications: no  Cardiovascular status: hemodynamically stable  Respiratory status: acceptable  Hydration status: stable

## 2022-02-10 ENCOUNTER — ANESTHESIA EVENT (OUTPATIENT)
Dept: ENDOSCOPY | Age: 67
End: 2022-02-10
Payer: MEDICARE

## 2022-02-11 ENCOUNTER — HOSPITAL ENCOUNTER (OUTPATIENT)
Age: 67
Setting detail: OUTPATIENT SURGERY
Discharge: HOME | End: 2022-02-11
Attending: INTERNAL MEDICINE | Admitting: INTERNAL MEDICINE
Payer: MEDICARE

## 2022-02-11 ENCOUNTER — ANESTHESIA (OUTPATIENT)
Dept: ENDOSCOPY | Age: 67
End: 2022-02-11
Payer: MEDICARE

## 2022-02-11 VITALS — DIASTOLIC BLOOD PRESSURE: 65 MMHG | OXYGEN SATURATION: 100 % | SYSTOLIC BLOOD PRESSURE: 103 MMHG

## 2022-02-11 VITALS
DIASTOLIC BLOOD PRESSURE: 73 MMHG | HEIGHT: 73 IN | BODY MASS INDEX: 24.52 KG/M2 | SYSTOLIC BLOOD PRESSURE: 110 MMHG | TEMPERATURE: 97.5 F | OXYGEN SATURATION: 96 % | WEIGHT: 185 LBS | HEART RATE: 62 BPM | RESPIRATION RATE: 18 BRPM

## 2022-02-11 DIAGNOSIS — K22.70 BARRETT'S ESOPHAGUS WITHOUT DYSPLASIA: ICD-10-CM

## 2022-02-11 LAB
GLUCOSE BLD-MCNC: 119 MG/DL (ref 70–99)
PERFORMED ON: ABNORMAL

## 2022-02-11 PROCEDURE — C1888 ENDOVAS NON-CARDIAC ABL CATH: HCPCS | Performed by: INTERNAL MEDICINE

## 2022-02-11 PROCEDURE — 3700000000 HC ANESTHESIA ATTENDED CARE: Performed by: INTERNAL MEDICINE

## 2022-02-11 PROCEDURE — 88342 IMHCHEM/IMCYTCHM 1ST ANTB: CPT

## 2022-02-11 PROCEDURE — 3609012400 HC EGD TRANSORAL BIOPSY SINGLE/MULTIPLE: Performed by: INTERNAL MEDICINE

## 2022-02-11 PROCEDURE — 3700000001 HC ADD 15 MINUTES (ANESTHESIA): Performed by: INTERNAL MEDICINE

## 2022-02-11 PROCEDURE — 2580000003 HC RX 258: Performed by: ANESTHESIOLOGY

## 2022-02-11 PROCEDURE — 7100000010 HC PHASE II RECOVERY - FIRST 15 MIN: Performed by: INTERNAL MEDICINE

## 2022-02-11 PROCEDURE — 6360000002 HC RX W HCPCS: Performed by: NURSE ANESTHETIST, CERTIFIED REGISTERED

## 2022-02-11 PROCEDURE — 88305 TISSUE EXAM BY PATHOLOGIST: CPT

## 2022-02-11 PROCEDURE — 7100000011 HC PHASE II RECOVERY - ADDTL 15 MIN: Performed by: INTERNAL MEDICINE

## 2022-02-11 PROCEDURE — 3609013200 HC EGD W/ ABLATION: Performed by: INTERNAL MEDICINE

## 2022-02-11 PROCEDURE — 2709999900 HC NON-CHARGEABLE SUPPLY: Performed by: INTERNAL MEDICINE

## 2022-02-11 RX ORDER — PROPOFOL 10 MG/ML
INJECTION, EMULSION INTRAVENOUS CONTINUOUS PRN
Status: DISCONTINUED | OUTPATIENT
Start: 2022-02-11 | End: 2022-02-11 | Stop reason: SDUPTHER

## 2022-02-11 RX ORDER — PROPOFOL 10 MG/ML
INJECTION, EMULSION INTRAVENOUS PRN
Status: DISCONTINUED | OUTPATIENT
Start: 2022-02-11 | End: 2022-02-11 | Stop reason: SDUPTHER

## 2022-02-11 RX ORDER — PANTOPRAZOLE SODIUM 40 MG/1
TABLET, DELAYED RELEASE ORAL
COMMUNITY
Start: 2022-01-16

## 2022-02-11 RX ORDER — SODIUM CHLORIDE, SODIUM LACTATE, POTASSIUM CHLORIDE, CALCIUM CHLORIDE 600; 310; 30; 20 MG/100ML; MG/100ML; MG/100ML; MG/100ML
INJECTION, SOLUTION INTRAVENOUS CONTINUOUS
Status: DISCONTINUED | OUTPATIENT
Start: 2022-02-11 | End: 2022-02-11 | Stop reason: HOSPADM

## 2022-02-11 RX ORDER — PHENYLEPHRINE HYDROCHLORIDE 10 MG/ML
INJECTION INTRAVENOUS PRN
Status: DISCONTINUED | OUTPATIENT
Start: 2022-02-11 | End: 2022-02-11 | Stop reason: SDUPTHER

## 2022-02-11 RX ADMIN — PROPOFOL 125 MCG/KG/MIN: 10 INJECTION, EMULSION INTRAVENOUS at 08:57

## 2022-02-11 RX ADMIN — PROPOFOL 150 MG: 10 INJECTION, EMULSION INTRAVENOUS at 08:57

## 2022-02-11 RX ADMIN — PHENYLEPHRINE HYDROCHLORIDE 100 MCG: 10 INJECTION INTRAVENOUS at 09:07

## 2022-02-11 RX ADMIN — Medication 100 MG: at 08:57

## 2022-02-11 RX ADMIN — SODIUM CHLORIDE, POTASSIUM CHLORIDE, SODIUM LACTATE AND CALCIUM CHLORIDE: 600; 310; 30; 20 INJECTION, SOLUTION INTRAVENOUS at 08:10

## 2022-02-11 ASSESSMENT — PULMONARY FUNCTION TESTS
PIF_VALUE: 0
PIF_VALUE: 1
PIF_VALUE: 0
PIF_VALUE: 1
PIF_VALUE: 0

## 2022-02-11 ASSESSMENT — PAIN - FUNCTIONAL ASSESSMENT: PAIN_FUNCTIONAL_ASSESSMENT: 0-10

## 2022-02-11 NOTE — PROGRESS NOTES
Ambulatory Surgery/Procedure Discharge Note    Vitals:    02/11/22 0950   BP: 110/73   Pulse: 62   Resp: 18   Temp: 97.5 °F (36.4 °C)   SpO2: 96%       In: 1100 [I.V.:1100]  Out: -     Restroom use offered before discharge. Yes    Pain assessment:  none  Pain Level: 0      2/11/2022 9:59 AM     Patient tolerated procedure very well. Patient denies any pain, nausea, or abdominal discomfort post procedure. Discharge instructions reviewed with patient and family member. Patient and family member verbalized understanding and given  Written copy. Patient left the department via ambulatory and/or wheelchair to go home with family member via car.

## 2022-02-11 NOTE — H&P
History and Physical / Pre-Sedation Assessment    Patient:  Rosario Hale   :   1955     Intended Procedure:  egd     HPI: cornell with dysplasia, fu rfa    Past Medical History:   has a past medical history of Cornell esophagus, Diabetes mellitus ([facility] [address] Esophageal lesion, H/O removal of cyst, Hx of blood clots, Hyperlipidemia, Hypertension, Pneumonia, and Thyroid disease. Past Surgical History:   has a past surgical history that includes Colonoscopy; Endoscopy, colon, diagnostic (13); fracture surgery (Left, spring 2006); Upper gastrointestinal endoscopy (N/A, 2018); Colonoscopy (N/A, 2019); Upper gastrointestinal endoscopy (N/A, 2019); Upper gastrointestinal endoscopy (N/A, 2020); Upper gastrointestinal endoscopy (N/A, 2021); Upper gastrointestinal endoscopy (N/A, 2021); Upper gastrointestinal endoscopy (N/A, 2021); Upper gastrointestinal endoscopy (N/A, 2021); Upper gastrointestinal endoscopy (N/A, 2021); Upper gastrointestinal endoscopy (N/A, 2021); Upper gastrointestinal endoscopy (N/A, 10/8/2021); and Upper gastrointestinal endoscopy (N/A, 2021). Medications:  Prior to Admission medications    Medication Sig Start Date End Date Taking? Authorizing Provider   Multiple Vitamin (MULTIVITAMIN) tablet 1 tablet  10/16/21  Yes Historical Provider, MD   levothyroxine (SYNTHROID) 125 MCG tablet Take 125 mcg by mouth Daily KIRILL   Yes Historical Provider, MD   ezetimibe-simvastatin (VYTORIN) 10-40 MG per tablet Take 1 tablet by mouth nightly. Yes Historical Provider, MD   losartan-hydrochlorothiazide (HYZAAR) 100-25 MG per tablet Take 1 tablet by mouth daily. Yes Historical Provider, MD   pantoprazole (PROTONIX) 40 MG tablet TAKE 1 TABLET BY MOUTH TWICE DAILY 22   Historical Provider, MD   sitaGLIPtin-metFORMIN (JANUMET XR)  MG TB24 tablet Take 1 tablet by mouth 2 times daily.     Historical Provider, MD       [facility] History:  family history includes Cancer in his mother. Social History:   reports that he has been smoking cigars. He has never used smokeless tobacco. He reports previous alcohol use. He reports that he does not use drugs. Allergies:  Latex and Morphine    ROS:  twelve point system review was unremarkable except for above noted history. Nurses notes reviewed and agreed. Medications reviewed    Physical Exam:  Vital Signs: /71   Pulse 74   Temp 97.2 °F (36.2 °C) (Temporal)   Resp 12   Ht 6' 1\" (1.854 m)   Wt 185 lb (83.9 kg)   SpO2 100%   BMI 24.41 kg/m²    Skin: normal  HEENT: normal  Neck: supple. No adenopathy. No thyromegaly. No JVD. Pulmonary:Normal  Cardiac:Normal  Abdomen:Normal  MS: normal  Neuro: normal  Ext: no edema. Pulses normal    Pre-Procedure Assessment / Plan:  ASA 2 - Patient with mild systemic disease with no functional limitations  Mallampati Airway Assessment:  Mallampati Class II - (soft palate, fauces & uvula are visible)  Level of Sedation Plan: Moderate sedation  Post Procedure plan: Return to same level of care    I assessed the patient and find that the patient is in satisfactory condition to proceed with the planned procedure and sedation plan. I have explained the risk, benefits, and alternatives to the procedure. The patient understands and agrees to proceed.   Yes    Cesar Allred MD  8:05 AM 2/11/2022

## 2022-02-11 NOTE — ANESTHESIA POSTPROCEDURE EVALUATION
Department of Anesthesiology  Postprocedure Note    Patient: Ck Morel  MRN: [id number]  YOB: 1955  Date of evaluation: 2/11/2022  Time:  10:06 AM     Procedure Summary     Date: 02/11/22 Room / Location: [address] [place] [address] / [facility]    Anesthesia Start: 8098 Anesthesia Stop: 1302    Procedures:       ESOPHAGOGASTRODUODENOSCOPY RADIOFREQUENCY ABLATION (N/A )      EGD BIOPSY (N/A ) Diagnosis:       Branch's esophagus without dysplasia      (Branch's esophagus without dysplasia [K22.70])    Surgeons: Hillary Pastor MD Responsible Provider: Rufino Sprague DO    Anesthesia Type: MAC ASA Status: 2          Anesthesia Type: MAC    Joe Phase I: Joe Score: 10    Joe Phase II: Joe Score: 10    Last vitals: Reviewed and per EMR flowsheets.        Anesthesia Post Evaluation    Patient location during evaluation: PACU  Patient participation: complete - patient participated  Level of consciousness: awake and alert  Airway patency: patent  Nausea & Vomiting: no nausea and no vomiting  Cardiovascular status: blood pressure returned to baseline  Respiratory status: acceptable  Hydration status: euvolemic

## 2022-02-26 NOTE — OP NOTE
[address]                                OPERATIVE REPORT    PATIENT NAME: Summer Argueta                     :        1955  MED REC NO:   [id number]                          ROOM:  ACCOUNT NO:   [de-identified]                           ADMIT DATE: 2022  PROVIDER:     Jorge Chacko MD    DATE OF PROCEDURE:  2022    SURGEON:  Jorge Chacko MD    INDICATION FOR PROCEDURE:  Branch esophagus with low-grade dysplasia -  followup radiofrequency ablation. DESCRIPTION OF PROCEDURE:  With the patient in the left lateral position  and after IV Diprivan, the Olympus video endoscope was introduced into  the esophagus and advanced toward the gastroesophageal junction. The  current Branch at the GE junction was noted and there appeared to be  some residual left from prior ablation. Further radiofrequency ablation  was performed today rather aggressively to ablate the entire area given  the dysplasia and the pathological finding. Stomach was normal.  The  duodenum was normal.  Biopsy was obtained for Helicobacter pylori from  the antrum. Scope was then removed without complication. IMPRESSION:  1. Hiatus hernia. 2.  Residual Branch esophagus. ESTIMATED BLOOD LOSS:  None.         Shai Simons MD    D: 2022 15:05:59       T: 2022 19:20:32     [email or web address]  Job#: [id number]     Doc#: [id number]    CC:

## 2022-05-10 ENCOUNTER — ANESTHESIA EVENT (OUTPATIENT)
Dept: ENDOSCOPY | Age: 67
End: 2022-05-10
Payer: MEDICARE

## 2022-05-11 ENCOUNTER — HOSPITAL ENCOUNTER (OUTPATIENT)
Age: 67
Setting detail: OUTPATIENT SURGERY
Discharge: HOME | End: 2022-05-11
Attending: INTERNAL MEDICINE | Admitting: INTERNAL MEDICINE
Payer: MEDICARE

## 2022-05-11 ENCOUNTER — ANESTHESIA (OUTPATIENT)
Dept: ENDOSCOPY | Age: 67
End: 2022-05-11
Payer: MEDICARE

## 2022-05-11 VITALS
HEIGHT: 73 IN | HEART RATE: 60 BPM | WEIGHT: 186 LBS | RESPIRATION RATE: 12 BRPM | SYSTOLIC BLOOD PRESSURE: 112 MMHG | BODY MASS INDEX: 24.65 KG/M2 | TEMPERATURE: 97.2 F | DIASTOLIC BLOOD PRESSURE: 68 MMHG | OXYGEN SATURATION: 95 %

## 2022-05-11 VITALS — OXYGEN SATURATION: 99 % | DIASTOLIC BLOOD PRESSURE: 58 MMHG | SYSTOLIC BLOOD PRESSURE: 97 MMHG

## 2022-05-11 DIAGNOSIS — K22.70 BARRETT'S ESOPHAGUS WITHOUT DYSPLASIA: ICD-10-CM

## 2022-05-11 LAB
GLUCOSE BLD-MCNC: 110 MG/DL (ref 70–99)
PERFORMED ON: ABNORMAL

## 2022-05-11 PROCEDURE — 3609013200 HC EGD W/ ABLATION: Performed by: INTERNAL MEDICINE

## 2022-05-11 PROCEDURE — 2580000003 HC RX 258: Performed by: ANESTHESIOLOGY

## 2022-05-11 PROCEDURE — 2580000003 HC RX 258: Performed by: NURSE ANESTHETIST, CERTIFIED REGISTERED

## 2022-05-11 PROCEDURE — 7100000011 HC PHASE II RECOVERY - ADDTL 15 MIN: Performed by: INTERNAL MEDICINE

## 2022-05-11 PROCEDURE — 88305 TISSUE EXAM BY PATHOLOGIST: CPT

## 2022-05-11 PROCEDURE — 3700000000 HC ANESTHESIA ATTENDED CARE: Performed by: INTERNAL MEDICINE

## 2022-05-11 PROCEDURE — 2709999900 HC NON-CHARGEABLE SUPPLY: Performed by: INTERNAL MEDICINE

## 2022-05-11 PROCEDURE — 88342 IMHCHEM/IMCYTCHM 1ST ANTB: CPT

## 2022-05-11 PROCEDURE — 3700000001 HC ADD 15 MINUTES (ANESTHESIA): Performed by: INTERNAL MEDICINE

## 2022-05-11 PROCEDURE — 3609012400 HC EGD TRANSORAL BIOPSY SINGLE/MULTIPLE: Performed by: INTERNAL MEDICINE

## 2022-05-11 PROCEDURE — 7100000010 HC PHASE II RECOVERY - FIRST 15 MIN: Performed by: INTERNAL MEDICINE

## 2022-05-11 PROCEDURE — C1888 ENDOVAS NON-CARDIAC ABL CATH: HCPCS | Performed by: INTERNAL MEDICINE

## 2022-05-11 PROCEDURE — 6360000002 HC RX W HCPCS: Performed by: NURSE ANESTHETIST, CERTIFIED REGISTERED

## 2022-05-11 RX ORDER — PROPOFOL 10 MG/ML
INJECTION, EMULSION INTRAVENOUS PRN
Status: DISCONTINUED | OUTPATIENT
Start: 2022-05-11 | End: 2022-05-11 | Stop reason: SDUPTHER

## 2022-05-11 RX ORDER — SODIUM CHLORIDE, SODIUM LACTATE, POTASSIUM CHLORIDE, CALCIUM CHLORIDE 600; 310; 30; 20 MG/100ML; MG/100ML; MG/100ML; MG/100ML
INJECTION, SOLUTION INTRAVENOUS CONTINUOUS
Status: DISCONTINUED | OUTPATIENT
Start: 2022-05-11 | End: 2022-05-11 | Stop reason: HOSPADM

## 2022-05-11 RX ORDER — SODIUM CHLORIDE 0.9 % (FLUSH) 0.9 %
5-40 SYRINGE (ML) INJECTION EVERY 12 HOURS SCHEDULED
Status: CANCELLED | OUTPATIENT
Start: 2022-05-11

## 2022-05-11 RX ORDER — SODIUM CHLORIDE, SODIUM LACTATE, POTASSIUM CHLORIDE, CALCIUM CHLORIDE 600; 310; 30; 20 MG/100ML; MG/100ML; MG/100ML; MG/100ML
INJECTION, SOLUTION INTRAVENOUS CONTINUOUS PRN
Status: DISCONTINUED | OUTPATIENT
Start: 2022-05-11 | End: 2022-05-11 | Stop reason: SDUPTHER

## 2022-05-11 RX ORDER — SODIUM CHLORIDE 9 MG/ML
INJECTION, SOLUTION INTRAVENOUS PRN
Status: CANCELLED | OUTPATIENT
Start: 2022-05-11

## 2022-05-11 RX ORDER — PROPOFOL 10 MG/ML
INJECTION, EMULSION INTRAVENOUS CONTINUOUS PRN
Status: DISCONTINUED | OUTPATIENT
Start: 2022-05-11 | End: 2022-05-11 | Stop reason: SDUPTHER

## 2022-05-11 RX ORDER — ONDANSETRON 2 MG/ML
4 INJECTION INTRAMUSCULAR; INTRAVENOUS
Status: CANCELLED | OUTPATIENT
Start: 2022-05-11 | End: 2022-05-11

## 2022-05-11 RX ORDER — SODIUM CHLORIDE 0.9 % (FLUSH) 0.9 %
5-40 SYRINGE (ML) INJECTION PRN
Status: CANCELLED | OUTPATIENT
Start: 2022-05-11

## 2022-05-11 RX ORDER — LIDOCAINE HYDROCHLORIDE 20 MG/ML
INJECTION, SOLUTION INTRAVENOUS PRN
Status: DISCONTINUED | OUTPATIENT
Start: 2022-05-11 | End: 2022-05-11 | Stop reason: SDUPTHER

## 2022-05-11 RX ORDER — MEPERIDINE HYDROCHLORIDE 25 MG/ML
12.5 INJECTION INTRAMUSCULAR; INTRAVENOUS; SUBCUTANEOUS EVERY 5 MIN PRN
Status: CANCELLED | OUTPATIENT
Start: 2022-05-11

## 2022-05-11 RX ADMIN — PROPOFOL 50 MG: 10 INJECTION, EMULSION INTRAVENOUS at 08:46

## 2022-05-11 RX ADMIN — PROPOFOL 150 MCG/KG/MIN: 10 INJECTION, EMULSION INTRAVENOUS at 08:44

## 2022-05-11 RX ADMIN — SODIUM CHLORIDE, SODIUM LACTATE, POTASSIUM CHLORIDE, AND CALCIUM CHLORIDE: .6; .31; .03; .02 INJECTION, SOLUTION INTRAVENOUS at 08:34

## 2022-05-11 RX ADMIN — LIDOCAINE HYDROCHLORIDE 60 MG: 20 INJECTION, SOLUTION INTRAVENOUS at 08:44

## 2022-05-11 RX ADMIN — SODIUM CHLORIDE, POTASSIUM CHLORIDE, SODIUM LACTATE AND CALCIUM CHLORIDE: 600; 310; 30; 20 INJECTION, SOLUTION INTRAVENOUS at 07:22

## 2022-05-11 RX ADMIN — LIDOCAINE HYDROCHLORIDE 40 MG: 20 INJECTION, SOLUTION INTRAVENOUS at 08:55

## 2022-05-11 RX ADMIN — PROPOFOL 50 MG: 10 INJECTION, EMULSION INTRAVENOUS at 08:44

## 2022-05-11 ASSESSMENT — PULMONARY FUNCTION TESTS
PIF_VALUE: 1

## 2022-05-11 ASSESSMENT — PAIN - FUNCTIONAL ASSESSMENT: PAIN_FUNCTIONAL_ASSESSMENT: 0-10

## 2022-05-11 NOTE — PROGRESS NOTES
Patient to room [facility] bed# 21 for recovery post procedure. VS stable. Patient denies any  pain or nausea. Wife at bedside.

## 2022-05-11 NOTE — H&P
History and Physical / Pre-Sedation Assessment    Patient:  Keegan Guerrero   :   1955     Intended Procedure:  egd    HPI: fu cornell esophagus with dysplasia    Past Medical History:   has a past medical history of Cornell esophagus, Diabetes mellitus ([facility] [address] Esophageal lesion, H/O removal of cyst, Hx of blood clots, Hyperlipidemia, Hypertension, Pneumonia, and Thyroid disease. Past Surgical History:   has a past surgical history that includes Colonoscopy; Endoscopy, colon, diagnostic (13); fracture surgery (Left, spring 2006); Upper gastrointestinal endoscopy (N/A, 2018); Colonoscopy (N/A, 2019); Upper gastrointestinal endoscopy (N/A, 2019); Upper gastrointestinal endoscopy (N/A, 2020); Upper gastrointestinal endoscopy (N/A, 2021); Upper gastrointestinal endoscopy (N/A, 2021); Upper gastrointestinal endoscopy (N/A, 2021); Upper gastrointestinal endoscopy (N/A, 2021); Upper gastrointestinal endoscopy (N/A, 2021); Upper gastrointestinal endoscopy (N/A, 2021); Upper gastrointestinal endoscopy (N/A, 10/8/2021); Upper gastrointestinal endoscopy (N/A, 2021); Upper gastrointestinal endoscopy (N/A, 2022); and Upper gastrointestinal endoscopy (N/A, 2022). Medications:  Prior to Admission medications    Medication Sig Start Date End Date Taking? Authorizing Provider   pantoprazole (PROTONIX) 40 MG tablet TAKE 1 TABLET BY MOUTH TWICE DAILY 22   Historical Provider, MD   Multiple Vitamin (MULTIVITAMIN) tablet 1 tablet  10/16/21   Historical Provider, MD   levothyroxine (SYNTHROID) 125 MCG tablet Take 125 mcg by mouth Daily KIRILL    Historical Provider, MD   sitaGLIPtin-metFORMIN (JANUMET XR)  MG TB24 tablet Take 1 tablet by mouth 2 times daily. Historical Provider, MD   ezetimibe-simvastatin (VYTORIN) 10-40 MG per tablet Take 1 tablet by mouth nightly.     Historical Provider, MD   losartan-hydrochlorothiazide (HYZAAR) 100-25 MG per tablet Take 1 tablet by mouth daily. Historical Provider, MD       Family History:  family history includes Cancer in his mother. Social History:   reports that he has been smoking cigars. He has never used smokeless tobacco. He reports previous alcohol use. He reports that he does not use drugs. Allergies:  Latex and Morphine    ROS:  twelve point system review was unremarkable except for above noted history. Nurses notes reviewed and agreed. Medications reviewed    Physical Exam:  Vital Signs: /80   Pulse 75   Temp 98.2 °F (36.8 °C) (Oral)   Resp 16   Ht 6' 1\" (1.854 m)   Wt 186 lb (84.4 kg)   SpO2 97%   BMI 24.54 kg/m²    Skin: normal  HEENT: normal  Neck: supple. No adenopathy. No thyromegaly. No JVD. Pulmonary:Normal  Cardiac:Normal  Abdomen:Normal  MS: normal  Neuro: normal  Ext: no edema. Pulses normal    Pre-Procedure Assessment / Plan:  ASA 2 - Patient with mild systemic disease with no functional limitations  Mallampati Airway Assessment:  Mallampati Class II - (soft palate, fauces & uvula are visible)  Level of Sedation Plan: Moderate sedation  Post Procedure plan: Return to same level of care    I assessed the patient and find that the patient is in satisfactory condition to proceed with the planned procedure and sedation plan. I have explained the risk, benefits, and alternatives to the procedure. The patient understands and agrees to proceed.   Yes    Matt Porter MD  8:37 AM 5/11/2022

## 2022-05-11 NOTE — ANESTHESIA POSTPROCEDURE EVALUATION
Department of Anesthesiology  Postprocedure Note    Patient: Efraín Valadez  MRN: [id number]  YOB: 1955  Date of evaluation: 5/11/2022  Time:  9:33 AM     Procedure Summary     Date: 05/11/22 Room / Location: [address] [place] [address]    Anesthesia Start: 5005 Anesthesia Stop: 9452    Procedure: ESOPHAGOGASTRODUODENOSCOPY (RADIOFREQUENCY ABLATION)-STANDBY (N/A ) Diagnosis:       Branch's esophagus without dysplasia      (Branch's esophagus without dysplasia [K22.70])    Surgeons: Kris David MD Responsible Provider: Benton Saini MD    Anesthesia Type: MAC ASA Status: 3          Anesthesia Type: No value filed. Joe Phase I: Joe Score: 10    Joe Phase II:      Last vitals: Reviewed and per EMR flowsheets.        Anesthesia Post Evaluation    Patient location during evaluation: PACU  Patient participation: complete - patient participated  Level of consciousness: awake and alert  Airway patency: patent  Nausea & Vomiting: no nausea and no vomiting  Complications: no  Cardiovascular status: hemodynamically stable  Respiratory status: acceptable  Hydration status: euvolemic  Multimodal analgesia pain management approach

## 2022-05-11 NOTE — PROGRESS NOTES
Dr. Jaleesa Garcia at bedside- spoke to patient and wife. Patient instructed full liquid diet today, then soft diet for 2 days. Increase protonix meds to twice daily until follow up in 2 weeks. VS stable. No pain no nausea. IV dcd, dressed and discharged via wc to car. Wife driving him home.

## 2022-05-11 NOTE — ANESTHESIA PRE PROCEDURE
Department of Anesthesiology  Preprocedure Note       Name:  Marco Mancilla   Age:  74 y.o.  :  1955                                          MRN:  [id number]         Date:  2022      Surgeon: Ki Ram):  Reggie Polanco MD    Procedure: Procedure(s):  ESOPHAGOGASTRODUODENOSCOPY (RADIOFREQUENCY ABLATION)-STANDBY    Medications prior to admission:   Prior to Admission medications    Medication Sig Start Date End Date Taking? Authorizing Provider   pantoprazole (PROTONIX) 40 MG tablet TAKE 1 TABLET BY MOUTH TWICE DAILY 22   Historical Provider, MD   Multiple Vitamin (MULTIVITAMIN) tablet 1 tablet  10/16/21   Historical Provider, MD   levothyroxine (SYNTHROID) 125 MCG tablet Take 125 mcg by mouth Daily KIRILL    Historical Provider, MD   sitaGLIPtin-metFORMIN (JANUMET XR)  MG TB24 tablet Take 1 tablet by mouth 2 times daily. Historical Provider, MD   ezetimibe-simvastatin (VYTORIN) 10-40 MG per tablet Take 1 tablet by mouth nightly. Historical Provider, MD   losartan-hydrochlorothiazide (HYZAAR) 100-25 MG per tablet Take 1 tablet by mouth daily. Historical Provider, MD       Current medications:    No current outpatient medications on file. No current facility-administered medications for this visit. Allergies: Allergies   Allergen Reactions    Latex Rash     Any latex / rubber products    Morphine Nausea And Vomiting       Problem List:  There is no problem list on file for this patient.       Past Medical History:        Diagnosis Date    Branch esophagus     Diabetes mellitus ([facility] [address]     Type 2    Esophageal lesion     H/O removal of cyst     shin    Hx of blood clots spring 2008    L ankle break; clots in leg and PE    Hyperlipidemia     Hypertension     Pneumonia 3 WEEKS AGO     FINISHED ANTIBIOTICS ALREADY    Thyroid disease        Past Surgical History:        Procedure Laterality Date    COLONOSCOPY      COLONOSCOPY N/A 2019    COLONOSCOPY performed by Venus Quiñones MD at [address], DIAGNOSTIC  11/07/13    EGD with Ablation    FRACTURE SURGERY Left spring 2006    ankle-leg clot & PE    UPPER GASTROINTESTINAL ENDOSCOPY N/A 12/19/2018    EGD BIOPSY  (Gastric for H. Pylori) performed by Venus Quiñones MD at [address] 12/20/2019    EGD BIOPSY performed by Venus Quiñones MD at [address] [place] [address] [place] N/A 12/23/2020    EGD BIOPSY performed by Venus Quiñones MD at [address] [place] [address] [place] N/A 1/13/2021    EGD BIOPSY performed by Venus Quiñones MD at [facility] [address] 4/6/2021    EGD BIOPSY performed by Venus Quiñones MD at [address] [place] [address] [place] N/A 5/26/2021    ESOPHAGOGASTRODUODENOSCOPY WITH RADIOFREQUENCY ABLATION performed by Venus Quiñones MD at [address] [place] [address] [place] 5/26/2021    EGD BIOPSY performed by Venus Quiñones MD at [address] [place] [address] [place] N/A 7/30/2021    ESOPHAGOGASTRODUODENOSCOPY RADIOFREQUENCY ABLATION performed by Venus Quiñones MD at [address] [place] [address] [place] N/A 7/30/2021    EGD BIOPSY performed by Venus Quiñones MD at [facility] 10/8/2021    EGD BIOPSY performed by Venus Quiñones MD at [address] [place] [address] [place] N/A 11/4/2021    ESOPHAGOGASTRODUODENOSCOPY WITH RADIOFREQUENCY ABLATION performed by Venus Quiñones MD at [address] [place] [address] [place] N/A 2/11/2022    ESOPHAGOGASTRODUODENOSCOPY RADIOFREQUENCY ABLATION performed by Venus Quiñones MD at [address] [place] [address] [facility] N/A 2/11/2022    EGD BIOPSY performed by Venus Quiñones MD at [address] [facility] History:    Social History     Tobacco Use    Smoking status: Current Some Day Smoker     Types: Cigars    Smokeless tobacco: Never Used    Tobacco comment: rarely   Substance Use Topics    Alcohol use: Not Currently                                Ready to quit: Not Answered  Counseling given: Not Answered  Comment: rarely      Vital Signs (Current): There were no vitals filed for this visit. BP Readings from Last 3 Encounters:   02/11/22 103/65   02/11/22 110/73   11/04/21 (!) 94/52       NPO Status:                                                                                 BMI:   Wt Readings from Last 3 Encounters:   02/11/22 185 lb (83.9 kg)   11/04/21 184 lb (83.5 kg)   10/08/21 184 lb (83.5 kg)     There is no height or weight on file to calculate BMI.    CBC: No results found for: WBC, RBC, HGB, HCT, MCV, RDW, PLT    CMP:   Lab Results   Component Value Date     02/28/2014    K 4.3 02/28/2014     02/28/2014    CO2 29 02/28/2014    BUN 14 02/28/2014    CREATININE 0.97 02/28/2014    GFRAA >60 02/28/2014    LABGLOM >60 02/28/2014    GLUCOSE 108 02/28/2014    CALCIUM 9.7 02/28/2014       POC Tests:   No results for input(s): POCGLU, POCNA, POCK, POCCL, POCBUN, POCHEMO, POCHCT in the last 72 hours.     Coags: No results found for: PROTIME, INR, APTT    HCG (If Applicable): No results found for: PREGTESTUR, PREGSERUM, HCG, HCGQUANT     ABGs: No results found for: PHART, PO2ART, OAZ0OLP, IEN1CFN, BEART, O5OWVKQL     Type & Screen (If Applicable):  No results found for: LABABO, LABRH    Drug/Infectious Status (If Applicable):  No results found for: HIV, HEPCAB    COVID-19 Screening (If Applicable):   Lab Results   Component Value Date    COVID19 Not Detected 05/21/2021           Anesthesia Evaluation  Patient summary reviewed history of anesthetic complications:   Airway: Mallampati: II  TM distance: >3 FB   Neck ROM: full  Mouth opening: > = 3 FB Dental: normal exam         Pulmonary:normal exam    (+) pneumonia:                             Cardiovascular:    (+) hypertension:,                   Neuro/Psych:   Negative Neuro/Psych ROS              GI/Hepatic/Renal:   (+) GERD:,          ROS comment: Branch. Endo/Other:    (+) Diabetes, hypothyroidism::., .                 Abdominal:             Vascular: negative vascular ROS. Other Findings:             Anesthesia Plan      MAC     ASA 3       Induction: intravenous. Anesthetic plan and risks discussed with patient. Plan discussed with CRNA.     Attending anesthesiologist reviewed and agrees with Preprocedure content              Paula Sommer MD   5/11/2022

## 2022-05-11 NOTE — OP NOTE
[facility] [address], [address]                                OPERATIVE REPORT    PATIENT NAME: Osman Saeed                     :        1955  MED REC NO:   [id number]                          ROOM:  ACCOUNT NO:   [de-identified]                           ADMIT DATE: 2022  PROVIDER:     Keturah Krabbe, MD    DATE OF PROCEDURE:  2022    SURGEON:  Keturah Krabbe, MD    INDICATION FOR THE PROCEDURE:  Followup dysplastic Branch's esophagus. Status post RFA. To ensure complete ablation. DESCRIPTION OF PROCEDURE:  With the patient in the left lateral position  and after IV Diprivan, the Olympus video endoscope was introduced into  the esophagus and advanced towards the gastroesophageal junction. Small  hiatus hernia was seen and excellent ablation results were noted in the  entire esophagus. However, there appeared to be some islands of  residual noted in the distal esophagus and into the hiatus hernia. Radiofrequency ablation was performed using the TTS electrode.  _____  applications were performed quite successfully. Stomach was normal  except for a very minor antral gastritis and biopsy was obtained for  Helicobacter pylori. The duodenum was normal.  Scope was then removed  without complication. IMPRESSION:  1. Hiatus hernia. Few remaining islands of Branch's esophagus. RFA  was performed. 2.  Excellent ablation from prior procedures. 3.  Minor antral gastritis. ESTIMATED BLOOD LOSS:  None. Elza Alonso MD    D: 2022 9:51:25       T: 2022 10:56:22     [email or web address]  Job#: [id number]     Doc#: [id number]    CC:   Keturah Krabbe, MD

## 2022-08-31 ENCOUNTER — ANESTHESIA EVENT (OUTPATIENT)
Dept: ENDOSCOPY | Age: 67
End: 2022-08-31
Payer: MEDICARE

## 2022-09-02 ENCOUNTER — HOSPITAL ENCOUNTER (OUTPATIENT)
Age: 67
Setting detail: OUTPATIENT SURGERY
Discharge: HOME | End: 2022-09-02
Attending: INTERNAL MEDICINE | Admitting: INTERNAL MEDICINE
Payer: MEDICARE

## 2022-09-02 ENCOUNTER — ANESTHESIA (OUTPATIENT)
Dept: ENDOSCOPY | Age: 67
End: 2022-09-02
Payer: MEDICARE

## 2022-09-02 VITALS
SYSTOLIC BLOOD PRESSURE: 107 MMHG | RESPIRATION RATE: 18 BRPM | OXYGEN SATURATION: 97 % | HEIGHT: 73 IN | BODY MASS INDEX: 24.12 KG/M2 | DIASTOLIC BLOOD PRESSURE: 72 MMHG | WEIGHT: 182 LBS | TEMPERATURE: 97 F | HEART RATE: 67 BPM

## 2022-09-02 DIAGNOSIS — Z12.11 SCREENING FOR COLON CANCER: ICD-10-CM

## 2022-09-02 LAB
GLUCOSE BLD-MCNC: 121 MG/DL (ref 70–99)
PERFORMED ON: ABNORMAL

## 2022-09-02 PROCEDURE — 7100000011 HC PHASE II RECOVERY - ADDTL 15 MIN: Performed by: INTERNAL MEDICINE

## 2022-09-02 PROCEDURE — 3700000001 HC ADD 15 MINUTES (ANESTHESIA): Performed by: INTERNAL MEDICINE

## 2022-09-02 PROCEDURE — 88305 TISSUE EXAM BY PATHOLOGIST: CPT

## 2022-09-02 PROCEDURE — 2580000003 HC RX 258: Performed by: ANESTHESIOLOGY

## 2022-09-02 PROCEDURE — 3700000000 HC ANESTHESIA ATTENDED CARE: Performed by: INTERNAL MEDICINE

## 2022-09-02 PROCEDURE — 2709999900 HC NON-CHARGEABLE SUPPLY: Performed by: INTERNAL MEDICINE

## 2022-09-02 PROCEDURE — 7100000010 HC PHASE II RECOVERY - FIRST 15 MIN: Performed by: INTERNAL MEDICINE

## 2022-09-02 PROCEDURE — 2500000003 HC RX 250 WO HCPCS: Performed by: INTERNAL MEDICINE

## 2022-09-02 PROCEDURE — 2500000003 HC RX 250 WO HCPCS: Performed by: NURSE ANESTHETIST, CERTIFIED REGISTERED

## 2022-09-02 PROCEDURE — 6360000002 HC RX W HCPCS: Performed by: NURSE ANESTHETIST, CERTIFIED REGISTERED

## 2022-09-02 PROCEDURE — 3609010600 HC COLONOSCOPY POLYPECTOMY SNARE/COLD BIOPSY: Performed by: INTERNAL MEDICINE

## 2022-09-02 RX ORDER — SODIUM CHLORIDE 0.9 % (FLUSH) 0.9 %
5-40 SYRINGE (ML) INJECTION EVERY 12 HOURS SCHEDULED
Status: DISCONTINUED | OUTPATIENT
Start: 2022-09-02 | End: 2022-09-02 | Stop reason: HOSPADM

## 2022-09-02 RX ORDER — PROPOFOL 10 MG/ML
INJECTION, EMULSION INTRAVENOUS CONTINUOUS PRN
Status: DISCONTINUED | OUTPATIENT
Start: 2022-09-02 | End: 2022-09-02 | Stop reason: SDUPTHER

## 2022-09-02 RX ORDER — SODIUM CHLORIDE 9 MG/ML
INJECTION, SOLUTION INTRAVENOUS PRN
Status: DISCONTINUED | OUTPATIENT
Start: 2022-09-02 | End: 2022-09-02 | Stop reason: HOSPADM

## 2022-09-02 RX ORDER — SODIUM CHLORIDE, SODIUM LACTATE, POTASSIUM CHLORIDE, CALCIUM CHLORIDE 600; 310; 30; 20 MG/100ML; MG/100ML; MG/100ML; MG/100ML
INJECTION, SOLUTION INTRAVENOUS CONTINUOUS
Status: DISCONTINUED | OUTPATIENT
Start: 2022-09-02 | End: 2022-09-02 | Stop reason: HOSPADM

## 2022-09-02 RX ORDER — LIDOCAINE HYDROCHLORIDE 10 MG/ML
INJECTION, SOLUTION EPIDURAL; INFILTRATION; INTRACAUDAL; PERINEURAL PRN
Status: DISCONTINUED | OUTPATIENT
Start: 2022-09-02 | End: 2022-09-02 | Stop reason: SDUPTHER

## 2022-09-02 RX ORDER — SODIUM CHLORIDE 0.9 % (FLUSH) 0.9 %
5-40 SYRINGE (ML) INJECTION PRN
Status: DISCONTINUED | OUTPATIENT
Start: 2022-09-02 | End: 2022-09-02 | Stop reason: HOSPADM

## 2022-09-02 RX ORDER — PROPOFOL 10 MG/ML
INJECTION, EMULSION INTRAVENOUS PRN
Status: DISCONTINUED | OUTPATIENT
Start: 2022-09-02 | End: 2022-09-02 | Stop reason: SDUPTHER

## 2022-09-02 RX ADMIN — SODIUM CHLORIDE, POTASSIUM CHLORIDE, SODIUM LACTATE AND CALCIUM CHLORIDE: 600; 310; 30; 20 INJECTION, SOLUTION INTRAVENOUS at 12:07

## 2022-09-02 RX ADMIN — PROPOFOL 100 MG: 10 INJECTION, EMULSION INTRAVENOUS at 13:07

## 2022-09-02 RX ADMIN — LIDOCAINE HYDROCHLORIDE 3 MG: 10 INJECTION, SOLUTION EPIDURAL; INFILTRATION; INTRACAUDAL; PERINEURAL at 13:07

## 2022-09-02 RX ADMIN — PROPOFOL 150 MCG/KG/MIN: 10 INJECTION, EMULSION INTRAVENOUS at 13:07

## 2022-09-02 RX ADMIN — PHENYLEPHRINE HYDROCHLORIDE 100 MCG: 10 INJECTION, SOLUTION INTRAMUSCULAR; INTRAVENOUS; SUBCUTANEOUS at 13:33

## 2022-09-02 ASSESSMENT — PAIN - FUNCTIONAL ASSESSMENT: PAIN_FUNCTIONAL_ASSESSMENT: 0-10

## 2022-09-02 ASSESSMENT — PAIN SCALES - GENERAL
PAINLEVEL_OUTOF10: 0

## 2022-09-02 ASSESSMENT — LIFESTYLE VARIABLES: SMOKING_STATUS: 0

## 2022-09-02 NOTE — H&P
History and Physical / Pre-Sedation Assessment    Patient:  Keyona Verma   :   1955     Intended Procedure:  colonoscopy    HPI: h/o polyps    Past Medical History:   has a past medical history of Branch esophagus, Diabetes mellitus ([facility] [address] Esophageal lesion, H/O removal of cyst, Hx of blood clots, Hyperlipidemia, Hypertension, Pneumonia, and Thyroid disease. Past Surgical History:   has a past surgical history that includes Colonoscopy; Endoscopy, colon, diagnostic (13); fracture surgery (Left, spring 2006); Upper gastrointestinal endoscopy (N/A, 2018); Colonoscopy (N/A, 2019); Upper gastrointestinal endoscopy (N/A, 2019); Upper gastrointestinal endoscopy (N/A, 2020); Upper gastrointestinal endoscopy (N/A, 2021); Upper gastrointestinal endoscopy (N/A, 2021); Upper gastrointestinal endoscopy (N/A, 2021); Upper gastrointestinal endoscopy (N/A, 2021); Upper gastrointestinal endoscopy (N/A, 2021); Upper gastrointestinal endoscopy (N/A, 2021); Upper gastrointestinal endoscopy (N/A, 10/8/2021); Upper gastrointestinal endoscopy (N/A, 2021); Upper gastrointestinal endoscopy (N/A, 2022); Upper gastrointestinal endoscopy (N/A, 2022); Upper gastrointestinal endoscopy (N/A, 2022); and Upper gastrointestinal endoscopy (N/A, 2022). Medications:  Prior to Admission medications    Medication Sig Start Date End Date Taking? Authorizing Provider   pantoprazole (PROTONIX) 40 MG tablet TAKE 1 TABLET BY MOUTH TWICE DAILY 22   Historical Provider, MD   Multiple Vitamin (MULTIVITAMIN) tablet 1 tablet  10/16/21   Historical Provider, MD   levothyroxine (SYNTHROID) 125 MCG tablet Take 125 mcg by mouth Daily KIRILL    Historical Provider, MD   SITagliptin-metFORMIN (JANUMET XR)  MG TB24 per extended release tablet Take 1 tablet by mouth 2 times daily.     Historical Provider, MD   ezetimibe-simvastatin (VYTORIN) 10-40 MG per tablet Take 1 tablet by mouth nightly. Historical Provider, MD   losartan-hydrochlorothiazide (HYZAAR) 100-25 MG per tablet Take 1 tablet by mouth daily. Historical Provider, MD       Family History:  family history includes Cancer in his mother. Social History:   reports that he quit smoking about 3 years ago. His smoking use included cigars. He has never used smokeless tobacco. He reports that he does not currently use alcohol. He reports that he does not use drugs. Allergies:  Latex and Morphine    ROS:  twelve point system review was unremarkable except for above noted history. Nurses notes reviewed and agreed. Medications reviewed    Physical Exam:  Vital Signs: /86   Pulse 80   Temp 97.3 °F (36.3 °C) (Temporal)   Resp 16   Ht 6' 1\" (1.854 m)   Wt 182 lb (82.6 kg)   SpO2 100%   BMI 24.01 kg/m²    Skin: normal  HEENT: normal  Neck: supple. No adenopathy. No thyromegaly. No JVD. Pulmonary:Normal  Cardiac:Normal  Abdomen:Normal  MS: normal  Neuro: normal  Ext: no edema. Pulses normal    Pre-Procedure Assessment / Plan:  ASA 2 - Patient with mild systemic disease with no functional limitations  Mallampati Airway Assessment:  Mallampati Class II - (soft palate, fauces & uvula are visible)  Level of Sedation Plan: Moderate sedation  Post Procedure plan: Return to same level of care    I assessed the patient and find that the patient is in satisfactory condition to proceed with the planned procedure and sedation plan. I have explained the risk, benefits, and alternatives to the procedure. The patient understands and agrees to proceed.   Yes    Jesus Arriola MD  1:00 PM 9/2/2022

## 2022-09-02 NOTE — DISCHARGE INSTRUCTIONS
ENDOSCOPY DISCHARGE INSTRUCTIONS:    Call the physician that did your procedure for any questions or concerns:           DR. Jocelin Ann:  [phone]               ACTIVITY:    There are potential side effects from the medications used for sedation and anesthesia during your procedure. These include:  Dizziness or light-headedness, confusion or memory loss, delayed reaction times, loss of coordination, nausea and vomiting. Because of your increased risk for injury, we ask that you observe the following precautions: For the next 24 hours,  DO NOT operate an automobile, bicycle, motorcycle, , power tools or large equipment of any kind. Do not drink alcohol, sign any legal documents or make any legal decisions for 24 hours. Do not bend your head over lower than your heart. DO sit on the side of bed/couch awhile before getting up. Plan on bedrest or quiet relaxation today. You may resume normal activities in 24 hours. DIET:    Your first meal today should be light, avoiding spicy and fatty foods. If you tolerate this first meal, then you may advance to your regular diet unless otherwise advised by your physician. NORMAL SYMPTOMS:  -Mild sore throat if youve had an EGD   -Gaseous discomfort if you've had an EGD or Colonoscopy. NOTIFY YOUR PHYSICIAN IF THESE SYMPTOMS OCCUR:  1. Fever (greater than 100)  5. Increased abdominal bloating  2. Severe pain    6. Excessive bleeding  3. Nausea and vomiting  7. Chest pain                                                                    4. Chills    8. Shortness of breath      ADDITIONAL INSTRUCTIONS:    Biopsy results: To be discussed at your follow-up visit. Educational Information:    Follow up: Schedule an appointment with Dr. Alexis Devine for two weeks from now if you have not already done so. Please review these discharge instructions this evening or tomorrow for  information you may have forgotten.             We want to thank you for choosing the [facility] as your health care provider. We always strive to provide you with excellent care while you are here. You may receive a survey in the mail regarding your care. We would appreciate you taking a few minutes of your time to complete this survey. Again, thank you for choosing the [address] Polyps: Care Instructions  Your Care Instructions     Colon polyps are growths in the colon or the rectum. The cause of most colon polyps is not known, and most people who get them do not have any problems. But a certain kind can turn into cancer. For this reason, regular testing for colon polyps is important for people as they get older. It is also important foranyone who has an increased risk for colon cancer. Polyps are usually found through routine colon cancer screening tests. Although most colon polyps are not cancerous, they are usually removed and then tested for cancer. Screening for colon cancer saves lives because the cancer canusually be cured if it is caught early. If you have a polyp that is the type that can turn into cancer, you may need more tests to examine your entire colon. The doctor will remove any otherpolyps that he or she finds, and you will be tested more often. Follow-up care is a key part of your treatment and safety. Be sure to make and go to all appointments, and call your doctor if you are having problems. It's also a good idea to know your test results and keep alist of the medicines you take. How can you care for yourself at home? Regular exams to look for colon polyps are the best way to prevent polyps from turning into colon cancer. These can include stool tests, sigmoidoscopy, colonoscopy, and CT colonography. Talk with your doctor about a testingschedule that is right for you. To prevent polyps  There is no home treatment that can prevent colon polyps. But these steps mayhelp lower your risk for cancer. Stay active.  Being active can help you get to and stay at a healthy weight. Try to exercise on most days of the week. Walking is a good choice. Eat well. Choose a variety of vegetables, fruits, legumes (such as peas and beans), fish, poultry, and whole grains. Do not smoke. If you need help quitting, talk to your doctor about stop-smoking programs and medicines. These can increase your chances of quitting for good. If you drink alcohol, limit how much you drink. Limit alcohol to 2 drinks a day for men and 1 drink a day for women. When should you call for help? Call your doctor now or seek immediate medical care if:    You have severe belly pain. Your stools are maroon or very bloody. Watch closely for changes in your health, and be sure to contact your doctor if:    You have a fever. You have nausea or vomiting. You have a change in bowel habits (new constipation or diarrhea). Your symptoms get worse or are not improving as expected. Where can you learn more? Go to https://[employer]. org and sign in to your Go[employer]Check account. Enter 95 [id number] in the [employer] box to learn more about \"Colon Polyps: Care Instructions. \"     If you do not have an account, please click on the \"Sign Up Now\" link. Current as of: September 8, 2021               Content Version: 13.3  © 9870-7515 Healthwise, Incorporated. Care instructions adapted under license by [facility] ([facility]). If you have questions about a medical condition or this instruction, always ask your healthcare professional. [place] [address] any warranty or liability for your use of this information.

## 2022-09-02 NOTE — ANESTHESIA PRE PROCEDURE
Department of Anesthesiology  Preprocedure Note       Name:  Chana Coppola   Age:  74 y.o.  :  1955                                          MRN:  [id number]         Date:  2022      Surgeon: Christin Jimenez):  Uzma Castellanos MD    Procedure: COLONOSCOPY    Medications prior to admission:   Prior to Admission medications    Medication Sig Start Date End Date Taking? Authorizing Provider   pantoprazole (PROTONIX) 40 MG tablet TAKE 1 TABLET BY MOUTH TWICE DAILY 22   Historical Provider, MD   Multiple Vitamin (MULTIVITAMIN) tablet 1 tablet  10/16/21   Historical Provider, MD   levothyroxine (SYNTHROID) 125 MCG tablet Take 125 mcg by mouth Daily KIRILL    Historical Provider, MD   sitaGLIPtin-metFORMIN (JANUMET XR)  MG TB24 tablet Take 1 tablet by mouth 2 times daily. Historical Provider, MD   ezetimibe-simvastatin (VYTORIN) 10-40 MG per tablet Take 1 tablet by mouth nightly. Historical Provider, MD   losartan-hydrochlorothiazide (HYZAAR) 100-25 MG per tablet Take 1 tablet by mouth daily. Historical Provider, MD       Current medications:    No current outpatient medications on file. No current facility-administered medications for this visit. Allergies: Allergies   Allergen Reactions    Latex Rash     Any latex / rubber products    Morphine Nausea And Vomiting       Problem List:  There is no problem list on file for this patient.       Past Medical History:        Diagnosis Date    Branch esophagus     Diabetes mellitus ([facility] [address]     Type 2    Esophageal lesion     H/O removal of cyst     shin    Hx of blood clots spring 2008    L ankle break; clots in leg and PE    Hyperlipidemia     Hypertension     Pneumonia 3 WEEKS AGO     FINISHED ANTIBIOTICS ALREADY    Thyroid disease        Past Surgical History:        Procedure Laterality Date    COLONOSCOPY      COLONOSCOPY N/A 2019    COLONOSCOPY performed by Uzma Castellanos MD at [address] [facility], DIAGNOSTIC  11/07/13    EGD with Ablation    FRACTURE SURGERY Left spring 2006    ankle-leg clot & PE    UPPER GASTROINTESTINAL ENDOSCOPY N/A 12/19/2018    EGD BIOPSY  (Gastric for H. Pylori) performed by Chapis Hatch MD at [address] [place] [address] Dr Marie 12/20/2019    EGD BIOPSY performed by Chapis Hacth MD at [facility] N/A 12/23/2020    EGD BIOPSY performed by Chapis Hatch MD at [facility] N/A 1/13/2021    EGD BIOPSY performed by Chapis Hatch MD at [address] [place] [address] Dr Marie 4/6/2021    EGD BIOPSY performed by Chapis Hatch MD at [facility] N/A 5/26/2021    ESOPHAGOGASTRODUODENOSCOPY WITH RADIOFREQUENCY ABLATION performed by Chapis Hatch MD at [address] [place] [address] Dr Marie 5/26/2021    EGD BIOPSY performed by Chapis Hatch MD at [facility] [address] N/A 7/30/2021    ESOPHAGOGASTRODUODENOSCOPY RADIOFREQUENCY ABLATION performed by Chapis Hatch MD at [facility] [address] N/A 7/30/2021    EGD BIOPSY performed by Chapis Hatch MD at [address] [place] [address] Dr Marie 10/8/2021    EGD BIOPSY performed by Chapis Hatch MD at [facility] [address] N/A 11/4/2021    ESOPHAGOGASTRODUODENOSCOPY WITH RADIOFREQUENCY ABLATION performed by Chapis Hatch MD at [facility] [address] N/A 2/11/2022    ESOPHAGOGASTRODUODENOSCOPY RADIOFREQUENCY ABLATION performed by Chapis Hatch MD at [facility] N/A 2/11/2022    EGD BIOPSY performed by Chapis Hatch MD at [facility] N/A 5/11/2022    ESOPHAGOGASTRODUODENOSCOPY (RADIOFREQUENCY ABLATION)-STANDBY performed by Chapis Hatch MD at [place] [address] ENDOSCOPY N/A 5/11/2022    EGD BIOPSY performed by Luma Dueñas MD at [address] [facility] History:    Social History     Tobacco Use    Smoking status: Some Days     Types: Cigars    Smokeless tobacco: Never    Tobacco comments:     rarely   Substance Use Topics    Alcohol use: Not Currently                                Ready to quit: Not Answered  Counseling given: Not Answered  Tobacco comments: rarely      Vital Signs (Current): There were no vitals filed for this visit. BP Readings from Last 3 Encounters:   05/11/22 (!) 97/58   05/11/22 112/68   02/11/22 103/65       NPO Status:                                                                                 BMI:   Wt Readings from Last 3 Encounters:   05/11/22 186 lb (84.4 kg)   02/11/22 185 lb (83.9 kg)   11/04/21 184 lb (83.5 kg)     There is no height or weight on file to calculate BMI.    CBC: No results found for: WBC, RBC, HGB, HCT, MCV, RDW, PLT    CMP:   Lab Results   Component Value Date/Time     02/28/2014 12:02 PM    K 4.3 02/28/2014 12:02 PM     02/28/2014 12:02 PM    CO2 29 02/28/2014 12:02 PM    BUN 14 02/28/2014 12:02 PM    CREATININE 0.97 02/28/2014 12:02 PM    GFRAA >60 02/28/2014 12:02 PM    LABGLOM >60 02/28/2014 12:02 PM    GLUCOSE 108 02/28/2014 12:02 PM    CALCIUM 9.7 02/28/2014 12:02 PM       POC Tests: No results for input(s): POCGLU, POCNA, POCK, POCCL, POCBUN, POCHEMO, POCHCT in the last 72 hours.     Coags: No results found for: PROTIME, INR, APTT    HCG (If Applicable): No results found for: PREGTESTUR, PREGSERUM, HCG, HCGQUANT     ABGs: No results found for: PHART, PO2ART, VFJ9SHZ, KHM7FXH, BEART, I4UVQWYS     Type & Screen (If Applicable):  No results found for: LABABO, LABRH    Anesthesia Evaluation    Airway: Mallampati: II  TM distance: >3 FB   Neck ROM: full  Mouth opening: > = 3 FB   Dental: normal exam         Pulmonary: breath sounds clear to auscultation      (-) COPD, asthma, recent URI, sleep apnea and not a current smoker                           Cardiovascular:  Exercise tolerance: good (>4 METS),   (+) hypertension:, hyperlipidemia    (-) pacemaker, past MI, CAD, CABG/stent, dysrhythmias,  angina,  CHF, orthopnea, PND and no pulmonary hypertension      Rhythm: regular  Rate: normal           Beta Blocker:  Not on Beta Blocker         Neuro/Psych:      (-) seizures, TIA and CVA           GI/Hepatic/Renal:   (+) GERD:,      (-) PUD, hepatitis, liver disease, no renal disease, bowel prep and no morbid obesity      ROS comment: Branch's esophagus. Endo/Other:    (+) DiabetesType II DM, , hypothyroidism::., no malignancy/cancer. (-) hyperthyroidism, blood dyscrasia, no malignancy/cancer               Abdominal:             Vascular:   + DVT, PE.        ROS comment: H/o blood clot that traveled to lung in 2006 s/p ankle fracture. Other Findings:             Anesthesia Plan      MAC     ASA 3       Induction: intravenous. Anesthetic plan and risks discussed with patient. Plan discussed with CRNA.                     Marylene Sickles, MD   9/2/2022

## 2022-09-02 NOTE — ANESTHESIA POSTPROCEDURE EVALUATION
Department of Anesthesiology  Postprocedure Note    Patient: Nguyễn Figueroa  MRN: [id number]  YOB: 1955  Date of evaluation: 9/2/2022      Procedure Summary     Date: 09/02/22 Room / Location: [address] [place] [address] Sharon Tidwell 03 / [facility]    Anesthesia Start: 5399 Anesthesia Stop: 8134    Procedure: COLONOSCOPY POLYPECTOMY SNARE/COLD BIOPSY Diagnosis:       Screening for colon cancer      (Screening for colon cancer [Z12.11])    Surgeons: Melvin Wood MD Responsible Provider: Sami Marmolejo MD    Anesthesia Type: MAC ASA Status: 3          Anesthesia Type: No value filed.     Joe Phase I: Joe Score: 10    Joe Phase II: Joe Score: 10      Anesthesia Post Evaluation    Patient location during evaluation: PACU  Patient participation: complete - patient participated  Level of consciousness: awake and alert  Pain score: 0  Airway patency: patent  Nausea & Vomiting: no nausea and no vomiting  Complications: no  Cardiovascular status: hemodynamically stable  Respiratory status: acceptable  Hydration status: euvolemic

## 2022-09-03 NOTE — OP NOTE
[facility] [address]                                OPERATIVE REPORT    PATIENT NAME: Margarita Gray                     :        1955  MED REC NO:   [id number]                          ROOM:  ACCOUNT NO:   [de-identified]                           ADMIT DATE: 2022  PROVIDER:     Tosha Genao MD    DATE OF PROCEDURE:  2022    SURGEON:  Tosha Genao MD    INDICATION FOR PROCEDURE:  History of colon polyps in a 73-year-old  gentleman with family history of cancer. DESCRIPTION OF PROCEDURE:  With the patient in the left lateral position  and after IV Diprivan, the Olympus video colonoscope was inserted into  the rectum and carefully advanced to the cecum. Minimal diverticulosis  of the left colon was seen. A small sessile polyp noted in the rectum. We removed it with the biopsy forceps. Careful inspection revealed no  other abnormality. Scope was then removed and procedure was terminated  without complication. IMPRESSION:  1. Mild diverticulosis of the left colon. 2.  A small rectal polyp - removed. ESTIMATED BLOOD LOSS:  None. Elvis Finley MD    D: 2022 13:46:18       T: 2022 14:42:59     [email or web address]  Job#: [id number]     Doc#: [id number]    CC:   Tosha Genao MD General

## 2023-04-18 ENCOUNTER — HOSPITAL ENCOUNTER (OUTPATIENT)
Age: 68
Setting detail: OUTPATIENT SURGERY
Discharge: HOME | End: 2023-04-18
Attending: INTERNAL MEDICINE | Admitting: INTERNAL MEDICINE
Payer: MEDICARE

## 2023-04-18 ENCOUNTER — ANESTHESIA EVENT (OUTPATIENT)
Dept: ENDOSCOPY | Age: 68
End: 2023-04-18
Payer: MEDICARE

## 2023-04-18 ENCOUNTER — ANESTHESIA (OUTPATIENT)
Dept: ENDOSCOPY | Age: 68
End: 2023-04-18
Payer: MEDICARE

## 2023-04-18 VITALS
HEART RATE: 56 BPM | OXYGEN SATURATION: 99 % | WEIGHT: 182 LBS | TEMPERATURE: 97.2 F | DIASTOLIC BLOOD PRESSURE: 70 MMHG | RESPIRATION RATE: 16 BRPM | SYSTOLIC BLOOD PRESSURE: 107 MMHG | BODY MASS INDEX: 24.12 KG/M2 | HEIGHT: 73 IN

## 2023-04-18 DIAGNOSIS — K22.70 BARRETT'S ESOPHAGUS WITHOUT DYSPLASIA: ICD-10-CM

## 2023-04-18 LAB
GLUCOSE BLD-MCNC: 120 MG/DL (ref 70–99)
PERFORMED ON: ABNORMAL

## 2023-04-18 PROCEDURE — 3609012400 HC EGD TRANSORAL BIOPSY SINGLE/MULTIPLE: Performed by: INTERNAL MEDICINE

## 2023-04-18 PROCEDURE — 6360000002 HC RX W HCPCS: Performed by: NURSE ANESTHETIST, CERTIFIED REGISTERED

## 2023-04-18 PROCEDURE — 7100000010 HC PHASE II RECOVERY - FIRST 15 MIN: Performed by: INTERNAL MEDICINE

## 2023-04-18 PROCEDURE — 2709999900 HC NON-CHARGEABLE SUPPLY: Performed by: INTERNAL MEDICINE

## 2023-04-18 PROCEDURE — 3700000001 HC ADD 15 MINUTES (ANESTHESIA): Performed by: INTERNAL MEDICINE

## 2023-04-18 PROCEDURE — 3700000000 HC ANESTHESIA ATTENDED CARE: Performed by: INTERNAL MEDICINE

## 2023-04-18 PROCEDURE — 2580000003 HC RX 258: Performed by: FAMILY MEDICINE

## 2023-04-18 PROCEDURE — 88305 TISSUE EXAM BY PATHOLOGIST: CPT

## 2023-04-18 PROCEDURE — 7100000011 HC PHASE II RECOVERY - ADDTL 15 MIN: Performed by: INTERNAL MEDICINE

## 2023-04-18 RX ORDER — SODIUM CHLORIDE, SODIUM LACTATE, POTASSIUM CHLORIDE, CALCIUM CHLORIDE 600; 310; 30; 20 MG/100ML; MG/100ML; MG/100ML; MG/100ML
INJECTION, SOLUTION INTRAVENOUS CONTINUOUS
Status: DISCONTINUED | OUTPATIENT
Start: 2023-04-18 | End: 2023-04-18 | Stop reason: HOSPADM

## 2023-04-18 RX ORDER — PROPOFOL 10 MG/ML
INJECTION, EMULSION INTRAVENOUS PRN
Status: DISCONTINUED | OUTPATIENT
Start: 2023-04-18 | End: 2023-04-18 | Stop reason: SDUPTHER

## 2023-04-18 RX ADMIN — PROPOFOL 25 MG: 10 INJECTION, EMULSION INTRAVENOUS at 08:20

## 2023-04-18 RX ADMIN — Medication 100 MG: at 08:17

## 2023-04-18 RX ADMIN — PROPOFOL 150 MG: 10 INJECTION, EMULSION INTRAVENOUS at 08:17

## 2023-04-18 RX ADMIN — SODIUM CHLORIDE, POTASSIUM CHLORIDE, SODIUM LACTATE AND CALCIUM CHLORIDE: 600; 310; 30; 20 INJECTION, SOLUTION INTRAVENOUS at 07:24

## 2023-04-18 ASSESSMENT — PAIN - FUNCTIONAL ASSESSMENT: PAIN_FUNCTIONAL_ASSESSMENT: 0-10

## 2023-04-18 NOTE — H&P
ezetimibe-simvastatin (VYTORIN) 10-40 MG per tablet Take 1 tablet by mouth nightly    Historical Provider, MD   losartan-hydrochlorothiazide (HYZAAR) 100-25 MG per tablet Take 1 tablet by mouth daily    Historical Provider, MD       Family History:  family history includes Cancer in his mother. Social History:   reports that he quit smoking about 4 years ago. His smoking use included cigars. He has never used smokeless tobacco. He reports that he does not currently use alcohol. He reports that he does not use drugs. Allergies:  Latex and Morphine    ROS:  twelve point system review was unremarkable except for above noted history. Nurses notes reviewed and agreed. Medications reviewed    Physical Exam:  Vital Signs: /77   Pulse 72   Temp 97.8 °F (36.6 °C) (Temporal)   Resp 18   Ht 6' 1\" (1.854 m)   Wt 182 lb (82.6 kg)   SpO2 97%   BMI 24.01 kg/m²    Skin: normal  HEENT: normal  Neck: supple. No adenopathy. No thyromegaly. No JVD. Pulmonary:Normal  Cardiac:Normal  Abdomen:Normal  MS: normal  Neuro: normal  Ext: no edema. Pulses normal    Pre-Procedure Assessment / Plan:  ASA 2 - Patient with mild systemic disease with no functional limitations  Mallampati Airway Assessment:  Mallampati Class II - (soft palate, fauces & uvula are visible)  Level of Sedation Plan: Moderate sedation  Post Procedure plan: Return to same level of care    I assessed the patient and find that the patient is in satisfactory condition to proceed with the planned procedure and sedation plan. I have explained the risk, benefits, and alternatives to the procedure. The patient understands and agrees to proceed.   Yes    Benay Sacks, MD  8:10 AM 4/18/2023

## 2023-04-18 NOTE — DISCHARGE INSTRUCTIONS
[facility] as your health care provider. We always strive to provide you with excellent care while you are here. You may receive a survey in the mail regarding your care. We would appreciate you taking a few minutes of your time to complete this survey.  Again, thank you for choosing the [facility]

## 2023-04-18 NOTE — ANESTHESIA PRE PROCEDURE
Surgical History:        Procedure Laterality Date    COLONOSCOPY      COLONOSCOPY N/A 5/8/2019    COLONOSCOPY performed by Nirav Dash MD at [address] N/A 9/2/2022    COLONOSCOPY POLYPECTOMY SNARE/COLD BIOPSY performed by Nirav Dash MD at [address] , DIAGNOSTIC  11/07/13    EGD with Ablation    FRACTURE SURGERY Left spring 2006    ankle-leg clot & PE    UPPER GASTROINTESTINAL ENDOSCOPY N/A 12/19/2018    EGD BIOPSY  (Gastric for H. Pylori) performed by Nirav Dash MD at [address] [place] [address] 12/20/2019    EGD BIOPSY performed by Nirav Dash MD at [address] [place] [address] 12/23/2020    EGD BIOPSY performed by Nirav Dash MD at [address] [place] [address] N/A 1/13/2021    EGD BIOPSY performed by Nirav Dash MD at [address] [place] [address] 4/6/2021    EGD BIOPSY performed by Nirav Dash MD at [address] [place] [address] N/A 5/26/2021    ESOPHAGOGASTRODUODENOSCOPY WITH RADIOFREQUENCY ABLATION performed by Nirav Dash MD at [address] [place] [address] 5/26/2021    EGD BIOPSY performed by Nirav Dash MD at [address] [place] [address] N/A 7/30/2021    ESOPHAGOGASTRODUODENOSCOPY RADIOFREQUENCY ABLATION performed by Nirav Dash MD at [address] [place] [address] N/A 7/30/2021    EGD BIOPSY performed by Nirav Dash MD at [address] [place] [address] 10/8/2021    EGD BIOPSY performed by Nirav Dash MD at [address] [place] [address] N/A 11/4/2021    ESOPHAGOGASTRODUODENOSCOPY WITH RADIOFREQUENCY ABLATION performed by Nirav Dash MD at [address] [place] [address] N/A 2/11/2022    ESOPHAGOGASTRODUODENOSCOPY RADIOFREQUENCY ABLATION performed by Nirav Dash MD at [place] [address]

## 2023-04-18 NOTE — PROGRESS NOTES
Ambulatory Surgery/Procedure Discharge Note    Vitals:    04/18/23 0835   BP: 95/60   Pulse: 64   Resp: 16   Temp: 97.2 °F (36.2 °C)   SpO2: 93%     Pt meets discharge criteria per Joe score. No intake/output data recorded. Restroom use offered before discharge. Yes    Pain assessment:  none  Pain Level: 0    Pt and S.O./family states \"ready to go home\". Pt alert and oriented x4. IV removed. Denies N/V or pain. Discharge instructions given to pt and wife with pt permission. Pt and wife verbalized understanding of all instructions. Left with all belongings, and discharge instructions. Patient discharged to home/self care.  Patient discharged via wheel chair by transporter to waiting family/S.O.       4/18/2023 8:38 AM

## 2023-04-18 NOTE — OP NOTE
[facility] [address]                                OPERATIVE REPORT    PATIENT NAME: Pro Voss                     :        1955  MED REC NO:   [id number]                          ROOM:  ACCOUNT NO:   [de-identified]                           ADMIT DATE: 2023  PROVIDER:     Handy Roth MD    DATE OF PROCEDURE:  2023    SURGEON:  Handy Roth MD    INDICATION FOR PROCEDURE:  Heartburn. Branch's esophagus with  low-grade dysplasia. Followup radiofrequency ablation. DESCRIPTION OF THE PROCEDURE:  With the patient in the left lateral  position and after IV Diprivan, the Olympus video endoscope was  introduced into esophagus and advanced towards the gastroesophageal  junction. The biopsies from GE junction were obtained. A small hiatus  hernia was identified. Further biopsies were obtained also from  distended esophagus. The stomach was normal.  The duodenum was also  normal.  No other abnormality. Scope was then removed without  complication. IMPRESSION:  1. Hiatal hernia. 2.  Status post radiofrequency ablation for Branch's esophagus. ESTIMATED BLOOD LOSS:  None. We will await Pathology finding and follow in the office.         Wayne Carl MD    D: 2023 8:47:49       T: 2023 9:03:20     [email or web address]  Job#: [id number]     Doc#: [id number]    CC:

## 2023-04-18 NOTE — ANESTHESIA POSTPROCEDURE EVALUATION
Department of Anesthesiology  Postprocedure Note    Patient: Edelmira Stanley  MRN: [id number]  YOB: 1955  Date of evaluation: 4/18/2023      Procedure Summary     Date: 04/18/23 Room / Location: [facility] [place] [address] / [facility]    Anesthesia Start: 2084 Anesthesia Stop: 3600    Procedure: EGD BIOPSY Diagnosis:       Branch's esophagus without dysplasia      (Branch's esophagus without dysplasia [K22.70])    Surgeons: Jeff Condon MD Responsible Provider: Nestor Whittaker MD    Anesthesia Type: general ASA Status: 3          Anesthesia Type: No value filed.     Joe Phase I: Joe Score: 10    Joe Phase II: Joe Score: 9      Anesthesia Post Evaluation    Patient location during evaluation: PACU  Patient participation: complete - patient participated  Level of consciousness: awake and alert  Pain score: 0  Airway patency: patent  Nausea & Vomiting: no nausea and no vomiting  Complications: no  Cardiovascular status: hemodynamically stable  Respiratory status: acceptable  Hydration status: euvolemic

## 2023-12-26 NOTE — ANESTHESIA PRE PROCEDURE
Department of Anesthesiology  Preprocedure Note       Name:  Chante Arreola   Age:  61 y.o.  :  1955                                          MRN:  [id number]         Date:  2022      Surgeon: Jolene Nesbitt):  Loraine Burrows MD    Procedure: Procedure(s):  ESOPHAGOGASTRODUODENOSCOPY RADIOFREQUENCY ABLATION    Medications prior to admission:   Prior to Admission medications    Medication Sig Start Date End Date Taking? Authorizing Provider   pantoprazole (PROTONIX) 40 MG tablet TAKE 1 TABLET BY MOUTH TWICE DAILY 22   Historical Provider, MD   Multiple Vitamin (MULTIVITAMIN) tablet 1 tablet  10/16/21   Historical Provider, MD   levothyroxine (SYNTHROID) 125 MCG tablet Take 125 mcg by mouth Daily KIRILL    Historical Provider, MD   sitaGLIPtin-metFORMIN (JANUMET XR)  MG TB24 tablet Take 1 tablet by mouth 2 times daily. Historical Provider, MD   ezetimibe-simvastatin (VYTORIN) 10-40 MG per tablet Take 1 tablet by mouth nightly. Historical Provider, MD   losartan-hydrochlorothiazide (HYZAAR) 100-25 MG per tablet Take 1 tablet by mouth daily. Historical Provider, MD       Current medications:    No current facility-administered medications for this visit. No current outpatient medications on file. Facility-Administered Medications Ordered in Other Visits   Medication Dose Route Frequency Provider Last Rate Last Admin    lactated ringers infusion   IntraVENous Continuous Peder [place],  mL/hr at 22 0814 NoRateChange at 22 7285       Allergies: Allergies   Allergen Reactions    Latex Rash     Any latex / rubber products    Morphine Nausea And Vomiting       Problem List:  There is no problem list on file for this patient.       Past Medical History:        Diagnosis Date    Branch esophagus     Diabetes mellitus ([facility] [address]     Type 2    Esophageal lesion     H/O removal of cyst     shin    Hx of blood clots spring 2008    L ankle break; clots in leg and PE Physician Discharge Summary     Patient ID:  Ilir Blanchard  [id number]  73 year old  1950    Admit date: 12/23/2023    Discharge date and time: 12/26/2023    Admitting Physician: Jasmin Ryan MD     Discharge Physician: Jasmin Ryan MD     Admission Diagnoses: Paraphimosis [N47.2]  Uncomplicated open wound of penis, initial encounter [S31.20XA]  Urinary tract infection associated with catheterization of urinary tract, unspecified indwelling urinary catheter type, initial encounter (CMD) [T83.511A, N39.0]  UTI (urinary tract infection) [N39.0]    Discharge Diagnoses:     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  Patient is asymptomatic and urinary tract infection with the Candida culture    N39.0 Urinary tract infection, site not specified     Urinary tract infection   Back abscess  E87.1 Hypo-osmolality and hyponatremia  N18.6 End stage renal disease  D64.9 Anemia, unspecified  I67.9 Cerebrovascular disease, unspecified  Pressure injury of skin of right buttock, unspecified injury stage [L89.319]   E11.8 Type 2 Diabetes Mellitus with unspecified complications  Concurrent Diagnoses:  Patient Active Problem List   Diagnosis    Hypoglycemia    Sepsis (CMD)    Acute CVA (cerebrovascular accident) (CMD)    Toxic metabolic encephalopathy    Gait abnormality    Weakness generalized    Peritoneal dialysis catheter in place (CMD)    Peritoneal dialysis status (CMD)    CVA, old, hemiparesis (CMD)    Hemiparesis affecting left side as late effect of stroke (CMD)    Pressure injury of skin of right buttock, unspecified injury stage    Wound, surgical, nonhealing, subsequent encounter    Hyponatremia    Urinary tract infection associated with catheterization of urinary tract, unspecified indwelling urinary catheter type, initial encounter (CMD)    UTI (urinary tract infection)    BPH (benign prostatic hyperplasia)     Difficulty in walking    Dyslipidemia    ESRD (end stage renal disease) on dialysis (CMD)    History of cerebrovascular accident    History of prostate cancer    HTN (hypertension)       Admission Condition: fair    Discharged Condition: stable    Hospital Course:       Patient is a very pleasant 73 year gentleman with past medical history significant for end-stage renal disease on hemodialysis, CVA with impaired mobility dysphagia and speech had recurrent multiple complex admissions with evidence of recent necrotizing right gluteal infection status post debridement placement of penrose and seton 10/3/23 with Dr. Veloz   Eventually requiring diverting ostomy after which the patient did very well his wound is almost healed.  But in the interim he had to be placed on hemodialysis and later converted back to peritoneal dialysis.       Patient was also recently discharged for hyponatremia, back abscess and urinary tract infection, was placed on appropriate antibiotics on discharge to home,     Patient presented to the ED last night  for evaluation of urinary catheter problem that began today. Per family member, the pt was discharged on 12/16, and being treated for UTI. Family was giving him sponge bath when they noticed a crusty area around urinary catheter site. They noticed the area was hot and bleeding after it was cleaned.     On this admission, patient was started on IV antibiotics, was seen by Nephrology, hyponatremia corrected with peritoneal dialysis, he was also seen by wound care, Infectious Disease and Urology, patient was treated for penile ulceration which could be secondary to trauma versus calciphylaxis versus balanitis.    Has been switched to p.o. antibiotics, was seen by Urology who recommended circumcision surgery but patient was not interested      Continue local wound care as below  Plan is to follow-up with wound care as an outpatient for possible HBO evaluation/ calciphylaxis   Hyperlipidemia     Hypertension     Pneumonia 3 WEEKS AGO     FINISHED ANTIBIOTICS ALREADY    Thyroid disease        Past Surgical History:        Procedure Laterality Date    COLONOSCOPY      COLONOSCOPY N/A 5/8/2019    COLONOSCOPY performed by Valeria Kidd MD at [address] [facility], DIAGNOSTIC  11/07/13    EGD with Ablation    FRACTURE SURGERY Left spring 2006    ankle-leg clot & PE    UPPER GASTROINTESTINAL ENDOSCOPY N/A 12/19/2018    EGD BIOPSY  (Gastric for H. Pylori) performed by Valeria Kidd MD at [facility] 12/20/2019    EGD BIOPSY performed by Valeria Kidd MD at [address] [place] [address] 12/23/2020    EGD BIOPSY performed by Valeria Kidd MD at [facility] N/A 1/13/2021    EGD BIOPSY performed by Valeria Kidd MD at [address] [place] [address] 4/6/2021    EGD BIOPSY performed by Valeria Kidd MD at [facility] N/A 5/26/2021    ESOPHAGOGASTRODUODENOSCOPY WITH RADIOFREQUENCY ABLATION performed by Valeria Kidd MD at [address] [place] [address] 5/26/2021    EGD BIOPSY performed by Valeria Kidd MD at [facility] N/A 7/30/2021    ESOPHAGOGASTRODUODENOSCOPY RADIOFREQUENCY ABLATION performed by Valeria Kidd MD at [facility] N/A 7/30/2021    EGD BIOPSY performed by Valeria Kidd MD at [address] [place] [address] 10/8/2021    EGD BIOPSY performed by Valeria Kidd MD at [facility] N/A 11/4/2021    ESOPHAGOGASTRODUODENOSCOPY WITH RADIOFREQUENCY ABLATION performed by Valeria Kidd MD at [facility] History:    Social History     Tobacco Use    Smoking status: Current Some Day Smoker     Types: Cigars    Smokeless tobacco: Never           Local Wound Care:  Retract foreskin on penis, cleanse with gentle soap and water, cleanse and pat dry  Apply betadine to penile wound on tip of penis, allow to dry for a moment before retracting back foreskin  Daily and PRN                Discharge Medications:     Summary of your Discharge Medications        Take these Medications        Details   amLODIPine 10 MG tablet  Commonly known as: NORVASC   Take 10 mg by mouth daily.     aspirin 81 MG chewable tablet   Chew 81 mg by mouth daily.     atorvastatin 20 MG tablet  Commonly known as: LIPITOR   Take 1 tablet by mouth nightly.     BD Pen Needle Zully U/F 32G X 4 MM Misc   Generic drug: Insulin Pen Needle  Use to inject insulin 4 times daily. Remove needle cover(s) to expose needle before injecting.     carvedilol 12.5 MG tablet  Commonly known as: COREG   Take 1 tablet by mouth in the morning and 1 tablet in the evening. Take with meals.     cephalexin 250 MG capsule  Commonly known as: KEFLEX  Start taking on: December 28, 2023   Take 2 capsules by mouth daily for 7 days. Take after dialysis on HD days. Do not start before December 28, 2023.     clopidogrel 75 MG tablet  Commonly known as: PLAVIX   Take 75 mg by mouth daily.     Dakins (1/4 strength) 0.125 % Solution   Use on wound daily as needed and as directed by wound care.     Dialyvite tablet   Take 1 tablet by mouth daily.     docusate sodium (DSS) 100 MG Cap   Take 100 mg by mouth daily.     FISH OIL PO   Take 2 capsules by mouth 2 times daily. Do not start before December 30, 2022.     fluconazole 100 MG tablet  Commonly known as: DIFLUCAN  Start taking on: December 28, 2023   Take 1 tablet by mouth daily for 7 days. Take after dialysis on HD days. Do not start before December 28, 2023.     gentamicin 0.1 % cream  Commonly known as: GARAMYCIN   Apply 1 Application topically as needed for Other. When access site port once daily     HumaLOG KwikPen 100 UNIT/ML pen-injector   Generic drug: Insulin  Used    Tobacco comment: rarely   Substance Use Topics    Alcohol use: Not Currently                                Ready to quit: Not Answered  Counseling given: Not Answered  Comment: rarely      Vital Signs (Current): There were no vitals filed for this visit.                                            BP Readings from Last 3 Encounters:   02/11/22 124/71   11/04/21 (!) 94/52   11/04/21 117/77       NPO Status:                                                                                 BMI:   Wt Readings from Last 3 Encounters:   02/11/22 185 lb (83.9 kg)   11/04/21 184 lb (83.5 kg)   10/08/21 184 lb (83.5 kg)     There is no height or weight on file to calculate BMI.    CBC: No results found for: WBC, RBC, HGB, HCT, MCV, RDW, PLT    CMP:   Lab Results   Component Value Date     02/28/2014    K 4.3 02/28/2014     02/28/2014    CO2 29 02/28/2014    BUN 14 02/28/2014    CREATININE 0.97 02/28/2014    GFRAA >60 02/28/2014    LABGLOM >60 02/28/2014    GLUCOSE 108 02/28/2014    CALCIUM 9.7 02/28/2014       POC Tests:   Recent Labs     02/11/22  0814   POCGLU 119*       Coags: No results found for: PROTIME, INR, APTT    HCG (If Applicable): No results found for: PREGTESTUR, PREGSERUM, HCG, HCGQUANT     ABGs: No results found for: PHART, PO2ART, YSG3MJO, BHZ8KZR, BEART, X4PKWCWJ     Type & Screen (If Applicable):  No results found for: LABABO, LABRH    Drug/Infectious Status (If Applicable):  No results found for: HIV, HEPCAB    COVID-19 Screening (If Applicable):   Lab Results   Component Value Date    COVID19 Not Detected 05/21/2021           Anesthesia Evaluation  Patient summary reviewed history of anesthetic complications:   Airway: Mallampati: II  TM distance: >3 FB   Neck ROM: full  Mouth opening: > = 3 FB Dental: normal exam         Pulmonary:                              Cardiovascular:    (+) hypertension:,                   Neuro/Psych:               GI/Hepatic/Renal:   (+) GERD:,          ROS Lispro (1 Unit Dial)  Inject 5 Units into the skin in the morning and 5 Units at noon and 5 Units in the evening. Inject before meals. Correction: if BG <100 hold, > 200 +1u, > 250 +2u, > 300 +3u. Max daily dose 24 units.     HumuLIN N KwikPen 100 UNIT/ML pen-injector   Generic drug: insulin NPH  Inject 10 Units into the skin nightly. Prime 2 units before each dose. To be given at the start of peritoneal dialysis (PD). Hold if no PD.     hydrALAZINE 50 MG tablet  Commonly known as: APRESOLINE   Take 1 tablet by mouth in the morning and 1 tablet at noon and 1 tablet in the evening.     pantoprazole 40 MG tablet  Commonly known as: PROTONIX   Take 1 tablet by mouth every 12 hours.     pentoxifylline 400 MG CR tablet  Commonly known as: TRENtal   Take 1 tablet by mouth daily (with breakfast).     polyethylene glycol 17 g packet  Commonly known as: MIRALAX   Take 17 g by mouth daily. Stir and dissolve powder in any 4 to 8 ounces of beverage, then drink.  Comment: May substitute     Renvela 800 MG tablet   Generic drug: sevelamer carbonate  Take 2 tablets by mouth in the morning and 2 tablets at noon and 2 tablets in the evening. Take with meals.     tamsulosin 0.4 MG Cap  Commonly known as: FLOMAX   Take 1 capsule by mouth daily after a meal. Do not start before November 29, 2023.                Discharge Labs:  Recent Labs   Lab 12/27/23  0841 12/25/23  0706 12/24/23  0319 12/23/23  0152 12/23/23  0146   SODIUM 136 138 134*  --  127*   POTASSIUM 4.8 3.6 3.8  --  4.4   CHLORIDE 104 104 102  --  94*   CO2 23 25 22  --  22   BUN 24* 27* 26*  --  54*   CREATININE 5.99* 7.10* 6.08*   < > 9.41*   GLUCOSE 122* 189* 126*  --  113*   WBC 7.0 6.2  --   --  10.1   HGB 10.5* 10.7*  --   --  10.4*   HCT 33.3* 32.6*  --   --  32.1*    278  --   --  287    < > = values in this interval not displayed.       Significant Diagnostic Studies and Procedures:      No orders to display       Please review complete radiology reports in  comment: Branch. Endo/Other:    (+) Diabetes, hypothyroidism::., .                 Abdominal:             Vascular: Other Findings:               Anesthesia Plan      MAC     ASA 2       Induction: intravenous. Anesthetic plan and risks discussed with patient.                       Leyla Garza DO   2/11/2022 the imaging section for EP[facility])       Discharge Exam:  Blood pressure 131/54, pulse 76, temperature 97.5 °F (36.4 °C), temperature source Oral, resp. rate 18, height 5' 9\" (1.753 m), weight 57 kg (125 lb 10.6 oz), SpO2 98 %.  General appearance - alert and in no distress  Neck - supple  and no adenopathy  Lungs - clear to auscultation  Heart - normal, regular rate and rhythm, no murmur noted  Abd - soft and non-tender. Non-distended. BS audible  Ext - no edema    Disposition: Home     Patient Instructions:     Activity: As tolerated    Diet: Low cholesterol diet    Code status: Full Resuscitation    Follow-up with PCP in 1-2 weeks    Discharge instructions explained to patient. Pt expressed understanding and agreement with plan of care and discharge. Questions answered to the best of my knowledge and to the patient's expressed satisfaction.   Time spent in direct face-to-face patient care and/or family counseling, coordinating care with other providers, and articulating the DC summary and DC instructions, amounts to >30 minutes      Signed:  CONI Richards  12/27/2023  6:59 PM         12/27/23    Visit Vitals  /54   Pulse 76   Temp 97.5 °F (36.4 °C) (Oral)   Resp 18   Ht 5' 9\" (1.753 m)   Wt 57 kg (125 lb 10.6 oz)   SpO2 98%   BMI 18.56 kg/m²         Physician addendum :  Chart reviewed.  Case discussed in detail with Alisa SAINZ   Portions of the history and physical exam were independently performed by me.  The entire medical decision making was done by me.  The Nurse practitioner  has collaborated with me and scribed my plan of care in her note.  Treatment plan discussed with the patient.     Lungs clear, heart S1S2, abdomen soft.    Labs, imaging, cultures reviewed      Jasmin Ryan MD

## 2024-04-23 ENCOUNTER — HOSPITAL ENCOUNTER (OUTPATIENT)
Age: 69
Setting detail: OUTPATIENT SURGERY
Discharge: HOME | End: 2024-04-23
Attending: INTERNAL MEDICINE | Admitting: INTERNAL MEDICINE
Payer: MEDICARE

## 2024-04-23 ENCOUNTER — ANESTHESIA (OUTPATIENT)
Dept: ENDOSCOPY | Age: 69
End: 2024-04-23
Payer: MEDICARE

## 2024-04-23 ENCOUNTER — ANESTHESIA EVENT (OUTPATIENT)
Dept: ENDOSCOPY | Age: 69
End: 2024-04-23
Payer: MEDICARE

## 2024-04-23 VITALS
OXYGEN SATURATION: 97 % | TEMPERATURE: 97 F | HEIGHT: 73 IN | BODY MASS INDEX: 24.25 KG/M2 | WEIGHT: 183 LBS | DIASTOLIC BLOOD PRESSURE: 75 MMHG | HEART RATE: 73 BPM | RESPIRATION RATE: 17 BRPM | SYSTOLIC BLOOD PRESSURE: 111 MMHG

## 2024-04-23 DIAGNOSIS — K22.70 BARRETT'S ESOPHAGUS WITHOUT DYSPLASIA: ICD-10-CM

## 2024-04-23 LAB
GLUCOSE BLD-MCNC: 125 MG/DL (ref 70–99)
PERFORMED ON: ABNORMAL

## 2024-04-23 PROCEDURE — 7100000011 HC PHASE II RECOVERY - ADDTL 15 MIN: Performed by: INTERNAL MEDICINE

## 2024-04-23 PROCEDURE — 3609013200 HC EGD W/ ABLATION: Performed by: INTERNAL MEDICINE

## 2024-04-23 PROCEDURE — 2500000003 HC RX 250 WO HCPCS: Performed by: NURSE ANESTHETIST, CERTIFIED REGISTERED

## 2024-04-23 PROCEDURE — 2709999900 HC NON-CHARGEABLE SUPPLY: Performed by: INTERNAL MEDICINE

## 2024-04-23 PROCEDURE — 3609012400 HC EGD TRANSORAL BIOPSY SINGLE/MULTIPLE: Performed by: INTERNAL MEDICINE

## 2024-04-23 PROCEDURE — 3700000001 HC ADD 15 MINUTES (ANESTHESIA): Performed by: INTERNAL MEDICINE

## 2024-04-23 PROCEDURE — 88305 TISSUE EXAM BY PATHOLOGIST: CPT

## 2024-04-23 PROCEDURE — 2580000003 HC RX 258: Performed by: INTERNAL MEDICINE

## 2024-04-23 PROCEDURE — 7100000010 HC PHASE II RECOVERY - FIRST 15 MIN: Performed by: INTERNAL MEDICINE

## 2024-04-23 PROCEDURE — 6360000002 HC RX W HCPCS: Performed by: NURSE ANESTHETIST, CERTIFIED REGISTERED

## 2024-04-23 PROCEDURE — 3700000000 HC ANESTHESIA ATTENDED CARE: Performed by: INTERNAL MEDICINE

## 2024-04-23 PROCEDURE — 2720000010 HC SURG SUPPLY STERILE: Performed by: INTERNAL MEDICINE

## 2024-04-23 RX ORDER — GLYCOPYRROLATE 0.2 MG/ML
INJECTION INTRAMUSCULAR; INTRAVENOUS PRN
Status: DISCONTINUED | OUTPATIENT
Start: 2024-04-23 | End: 2024-04-23 | Stop reason: SDUPTHER

## 2024-04-23 RX ORDER — PROPOFOL 10 MG/ML
INJECTION, EMULSION INTRAVENOUS CONTINUOUS PRN
Status: DISCONTINUED | OUTPATIENT
Start: 2024-04-23 | End: 2024-04-23 | Stop reason: SDUPTHER

## 2024-04-23 RX ORDER — LIDOCAINE HYDROCHLORIDE 20 MG/ML
INJECTION, SOLUTION EPIDURAL; INFILTRATION; INTRACAUDAL; PERINEURAL PRN
Status: DISCONTINUED | OUTPATIENT
Start: 2024-04-23 | End: 2024-04-23 | Stop reason: SDUPTHER

## 2024-04-23 RX ORDER — PROPOFOL 10 MG/ML
INJECTION, EMULSION INTRAVENOUS PRN
Status: DISCONTINUED | OUTPATIENT
Start: 2024-04-23 | End: 2024-04-23 | Stop reason: SDUPTHER

## 2024-04-23 RX ORDER — SODIUM CHLORIDE, SODIUM LACTATE, POTASSIUM CHLORIDE, CALCIUM CHLORIDE 600; 310; 30; 20 MG/100ML; MG/100ML; MG/100ML; MG/100ML
INJECTION, SOLUTION INTRAVENOUS CONTINUOUS
Status: DISCONTINUED | OUTPATIENT
Start: 2024-04-23 | End: 2024-04-23 | Stop reason: HOSPADM

## 2024-04-23 RX ADMIN — PROPOFOL 50 MG: 10 INJECTION, EMULSION INTRAVENOUS at 07:57

## 2024-04-23 RX ADMIN — LIDOCAINE HYDROCHLORIDE 50 MG: 20 INJECTION, SOLUTION EPIDURAL; INFILTRATION; INTRACAUDAL; PERINEURAL at 07:56

## 2024-04-23 RX ADMIN — SODIUM CHLORIDE, POTASSIUM CHLORIDE, SODIUM LACTATE AND CALCIUM CHLORIDE: 600; 310; 30; 20 INJECTION, SOLUTION INTRAVENOUS at 07:06

## 2024-04-23 RX ADMIN — GLYCOPYRROLATE 0.4 MG: 0.2 INJECTION INTRAMUSCULAR; INTRAVENOUS at 07:53

## 2024-04-23 RX ADMIN — PROPOFOL 150 MCG/KG/MIN: 10 INJECTION, EMULSION INTRAVENOUS at 07:58

## 2024-04-23 ASSESSMENT — PAIN SCALES - GENERAL: PAINLEVEL_OUTOF10: 0

## 2024-04-23 NOTE — DISCHARGE INSTRUCTIONS
ENDOSCOPY DISCHARGE INSTRUCTIONS:    Call the physician that did your procedure for any questions or concerns:           DR. ALEX:  [phone]               ACTIVITY:    There are potential side effects from the medications used for sedation and anesthesia during your procedure.  These include:  Dizziness or light-headedness, confusion or memory loss, delayed reaction times, loss of coordination, nausea and vomiting.  Because of your increased risk for injury, we ask that you observe the following precautions:  For the next 24 hours,  DO NOT operate an automobile, bicycle, motorcycle, , power tools or large equipment of any kind.  Do not drink alcohol, sign any legal documents or make any legal decisions for 24 hours.  Do not bend your head over lower than your heart.  DO sit on the side of bed/couch awhile before getting up.  Plan on bedrest or quiet relaxation today.  You may resume normal activities in 24 hours.    DIET:    Your first meal today should be light, avoiding spicy and fatty foods.  If you tolerate this first meal, then you may advance to your regular diet unless otherwise advised by your physician.    NORMAL SYMPTOMS:  -Mild sore throat if you’ve had an EGD   -Gaseous discomfort if you've had an EGD or Colonoscopy.     NOTIFY YOUR PHYSICIAN IF THESE SYMPTOMS OCCUR:  1. Fever (greater than 100)  5. Increased abdominal bloating  2. Severe pain    6. Excessive bleeding  3. Nausea and vomiting  7. Chest pain                                                                    4. Chills    8. Shortness of breath      ADDITIONAL INSTRUCTIONS:    Biopsy results: To be discussed at your follow-up visit.    Educational Information:    Follow up: Schedule an appointment with Dr. Alex for two weeks from now if you have not already done so.      Please review these discharge instructions this evening or tomorrow for  information you may have forgotten.            We want to thank you for choosing the

## 2024-04-23 NOTE — ANESTHESIA POSTPROCEDURE EVALUATION
Department of Anesthesiology  Postprocedure Note    Patient: Reji Burt  MRN: [id number]  YOB: 1955  Date of evaluation: 4/23/2024    Procedure Summary       Date: 04/23/24 Room / Location: [place] [address] / [facility]    Anesthesia Start: 0751 Anesthesia Stop: 0813    Procedures:       ESOPHAGOGASTRODUODENOSCOPY BIOPSY      ESOPHAGOGASTRODUODENOSCOPY POLYP ABLATION/OTHER Diagnosis:       Branch's esophagus without dysplasia      (Branch's esophagus without dysplasia [K22.70])    Surgeons: Estefany Faye MD Responsible Provider: Sagar Caballero MD    Anesthesia Type: MAC ASA Status: 2            Anesthesia Type: No value filed.    Joe Phase I: Joe Score: 10    Joe Phase II: Joe Score: 10    Anesthesia Post Evaluation    Patient location during evaluation: PACU  Patient participation: complete - patient participated  Level of consciousness: awake  Pain score: 0  Nausea & Vomiting: no nausea and no vomiting  Cardiovascular status: blood pressure returned to baseline  Respiratory status: acceptable  Hydration status: euvolemic  Pain management: adequate    No notable events documented.

## 2024-04-23 NOTE — PROCEDURES
[address] [address]                             PROCEDURE NOTE      PATIENT NAME: LAWANDA SANCHEZ               : 1955  MED REC NO: [id number]                      ROOM: Endo Pool  ACCOUNT NO: [id number]                       ADMIT DATE: 2024  PROVIDER: Estefany Alex MD      DATE OF PROCEDURE:  2024    SURGEON:  Estefany Alex MD    INDICATIONS:  69-year-old man with a history of Branch esophagus with dysplasia.  Status post RFA.  Today, he is having followup endoscopy.    DESCRIPTION OF PROCEDURE:  With the patient in the left lateral position and after IV Diprivan, the Olympus video endoscope was introduced into the esophagus and advanced toward the GE junction.  Small hiatal hernia was seen.  The esophagus was normal.  Biopsies from GE junction were obtained.  Excellent ablation results were noted.  The stomach was carefully inspected.  It was normal.  The duodenum revealed an angiodysplasia.  APC ablation was performed.  Scope was then removed without complication.    IMPRESSION:    1. Small hiatal hernia.  2. Status post radiofrequency ablation for Branch esophagus, excellent results.  3. Angiodysplasias of the duodenum.  APC ablation was performed.    ESTIMATED BLOOD LOSS:  None.          ESTEFANY ALEX MD      D:  2024 08:20:17     T:  2024 13:22:13     PREETHI/LARS  Job #:  [id number]     Doc#:  [id number]    CC:   Estefany Alex MD

## 2024-04-23 NOTE — PROGRESS NOTES
Ambulatory Surgery/Procedure Discharge Note    Vitals:    04/23/24 0841   BP: 111/75   Pulse: 73   Resp: 17   Temp:    SpO2: 97%       In: 100 [P.O.:100]  Out: -     Restroom use offered before discharge.  Yes    Pain assessment:  none  Pain Level: 0        Patient discharged to home/self care. Patient discharged via wheel chair by transporter to waiting family/S.O.       4/23/2024 8:44 AM

## 2024-04-23 NOTE — ANESTHESIA PRE PROCEDURE
Department of Anesthesiology  Preprocedure Note       Name:  Reji Burt   Age:  69 y.o.  :  1955                                          MRN:  [id number]         Date:  2024      Surgeon: Surgeon(s):  Estefany Faye MD    Procedure: Procedure(s):  ESOPHAGOGASTRODUODENOSCOPY    Medications prior to admission:   Prior to Admission medications    Medication Sig Start Date End Date Taking? Authorizing Provider   pantoprazole (PROTONIX) 40 MG tablet TAKE 1 TABLET BY MOUTH TWICE DAILY 22   Michael Taylor MD   Multiple Vitamin (MULTIVITAMIN) tablet 1 tablet 10/16/21   Michael Taylor MD   levothyroxine (SYNTHROID) 125 MCG tablet Take 1 tablet by mouth Daily KIRILL    Michael Taylor MD   SITagliptin-metFORMIN (JANUMET XR)  MG TB24 per extended release tablet Take 1 tablet by mouth 2 times daily.    Michael Taylor MD   ezetimibe-simvastatin (VYTORIN) 10-40 MG per tablet Take 1 tablet by mouth nightly    Michael Taylor MD   losartan-hydrochlorothiazide (HYZAAR) 100-25 MG per tablet Take 1 tablet by mouth daily    Michael Taylor MD       Current medications:    Current Facility-Administered Medications   Medication Dose Route Frequency Provider Last Rate Last Admin   • lactated ringers IV soln infusion   IntraVENous Continuous Estefany Faye  mL/hr at 24 0706 New Bag at 24 0706       Allergies:    Allergies   Allergen Reactions   • Latex Rash     Any latex / rubber products   • Morphine Nausea And Vomiting       Problem List:  There is no problem list on file for this patient.      Past Medical History:        Diagnosis Date   • Branch esophagus    • Diabetes mellitus (HCC)     Type 2   • Esophageal lesion    • H/O removal of cyst     shin   • Hx of blood clots spring 2008    L ankle break; clots in leg and PE   • Hyperlipidemia    • Hypertension    • Pneumonia 3 WEEKS AGO     FINISHED ANTIBIOTICS ALREADY   • Thyroid disease   You can access the FollowMyHealth Patient Portal offered by [facility] by registering at the following website: [email or web address]. By joining [employer]’s FollowMyHealth portal, you will also be able to view your health information using other applications (apps) compatible with our system. You can access the FollowMyHealth Patient Portal offered by [facility] by registering at the following website: [email or web address]. By joining [employer]’s FollowMyHealth portal, you will also be able to view your health information using other applications (apps) compatible with our system.

## 2024-04-23 NOTE — H&P
History and Physical / Pre-Sedation Assessment    Patient:  Reji Burt   :   1955     Intended Procedure:  egd    HPI: ximena with dysplasia. S/p rfa . fu    Past Medical History:   has a past medical history of Branch esophagus, Diabetes mellitus (HCC), Esophageal lesion, H/O removal of cyst, Hx of blood clots, Hyperlipidemia, Hypertension, Pneumonia, and Thyroid disease.     Past Surgical History:   has a past surgical history that includes Colonoscopy; Endoscopy, colon, diagnostic (13); fracture surgery (Left, spring 2006); Upper gastrointestinal endoscopy (N/A, 2018); Colonoscopy (N/A, 2019); Upper gastrointestinal endoscopy (N/A, 2019); Upper gastrointestinal endoscopy (N/A, 2020); Upper gastrointestinal endoscopy (N/A, 2021); Upper gastrointestinal endoscopy (N/A, 2021); Upper gastrointestinal endoscopy (N/A, 2021); Upper gastrointestinal endoscopy (N/A, 2021); Upper gastrointestinal endoscopy (N/A, 2021); Upper gastrointestinal endoscopy (N/A, 2021); Upper gastrointestinal endoscopy (N/A, 10/8/2021); Upper gastrointestinal endoscopy (N/A, 2021); Upper gastrointestinal endoscopy (N/A, 2022); Upper gastrointestinal endoscopy (N/A, 2022); Upper gastrointestinal endoscopy (N/A, 2022); Upper gastrointestinal endoscopy (N/A, 2022); Colonoscopy (N/A, 2022); and Upper gastrointestinal endoscopy (N/A, 2023).     Medications:  Prior to Admission medications    Medication Sig Start Date End Date Taking? Authorizing Provider   pantoprazole (PROTONIX) 40 MG tablet TAKE 1 TABLET BY MOUTH TWICE DAILY 22   Michael Taylor MD   Multiple Vitamin (MULTIVITAMIN) tablet 1 tablet 10/16/21   Michael Taylor MD   levothyroxine (SYNTHROID) 125 MCG tablet Take 1 tablet by mouth Daily KIRILL    Michael Taylor MD   SITagliptin-metFORMIN (JANUMET XR)  MG TB24 per extended release tablet Take 1 tablet by  mouth 2 times daily.    Provider, MD Michael   ezetimibe-simvastatin (VYTORIN) 10-40 MG per tablet Take 1 tablet by mouth nightly    ProviderMichael MD   losartan-hydrochlorothiazide (HYZAAR) 100-25 MG per tablet Take 1 tablet by mouth daily    ProviderMichael MD       Family History:  family history includes Cancer in his mother.    Social History:   reports that he quit smoking about 5 years ago. His smoking use included cigars. He has never used smokeless tobacco. He reports that he does not currently use alcohol. He reports that he does not use drugs.    Allergies:  Latex and Morphine    ROS:  twelve point system review was unremarkable except for above noted history.    Nurses notes reviewed and agreed.  Medications reviewed    Physical Exam:  Vital Signs: /77   Pulse 69   Temp 98.3 °F (36.8 °C) (Temporal)   Resp 16   Ht 1.854 m (6' 1\")   Wt 83 kg (183 lb)   SpO2 100%   BMI 24.14 kg/m²    Skin: normal  HEENT: normal  Neck: supple. No adenopathy. No thyromegaly. No JVD.   Pulmonary:Normal  Cardiac:Normal  Abdomen:Normal  MS: normal  Neuro: normal  Ext: no edema. Pulses normal    Pre-Procedure Assessment / Plan:  ASA 2 - Patient with mild systemic disease with no functional limitations  Mallampati Airway Assessment:  Mallampati Class II - (soft palate, fauces & uvula are visible)  Level of Sedation Plan:Moderate sedation  Post Procedure plan: Return to same level of care    I assessed the patient and find that the patient is in satisfactory condition to proceed with the planned procedure and sedation plan.    I have explained the risk, benefits, and alternatives to the procedure. The patient understands and agrees to proceed.  Yes    Estefany Faye MD  7:52 AM 4/23/2024

## 2024-08-20 ENCOUNTER — HOSPITAL ENCOUNTER (OUTPATIENT)
Age: 69
Setting detail: OUTPATIENT SURGERY
Discharge: HOME | End: 2024-08-20
Attending: INTERNAL MEDICINE | Admitting: INTERNAL MEDICINE
Payer: MEDICARE

## 2024-08-20 ENCOUNTER — ANESTHESIA EVENT (OUTPATIENT)
Dept: ENDOSCOPY | Age: 69
End: 2024-08-20
Payer: MEDICARE

## 2024-08-20 ENCOUNTER — ANESTHESIA (OUTPATIENT)
Dept: ENDOSCOPY | Age: 69
End: 2024-08-20
Payer: MEDICARE

## 2024-08-20 VITALS
DIASTOLIC BLOOD PRESSURE: 66 MMHG | OXYGEN SATURATION: 95 % | TEMPERATURE: 97.4 F | BODY MASS INDEX: 23.99 KG/M2 | HEART RATE: 61 BPM | HEIGHT: 73 IN | RESPIRATION RATE: 16 BRPM | WEIGHT: 181 LBS | SYSTOLIC BLOOD PRESSURE: 106 MMHG

## 2024-08-20 LAB
GLUCOSE BLD-MCNC: 127 MG/DL (ref 70–99)
PERFORMED ON: ABNORMAL

## 2024-08-20 PROCEDURE — 7100000011 HC PHASE II RECOVERY - ADDTL 15 MIN: Performed by: INTERNAL MEDICINE

## 2024-08-20 PROCEDURE — 3700000001 HC ADD 15 MINUTES (ANESTHESIA): Performed by: INTERNAL MEDICINE

## 2024-08-20 PROCEDURE — 3609013900 HC ENTEROSCOPY: Performed by: INTERNAL MEDICINE

## 2024-08-20 PROCEDURE — 7100000010 HC PHASE II RECOVERY - FIRST 15 MIN: Performed by: INTERNAL MEDICINE

## 2024-08-20 PROCEDURE — 2500000003 HC RX 250 WO HCPCS: Performed by: NURSE ANESTHETIST, CERTIFIED REGISTERED

## 2024-08-20 PROCEDURE — 6360000002 HC RX W HCPCS: Performed by: NURSE ANESTHETIST, CERTIFIED REGISTERED

## 2024-08-20 PROCEDURE — 3700000000 HC ANESTHESIA ATTENDED CARE: Performed by: INTERNAL MEDICINE

## 2024-08-20 PROCEDURE — 2580000003 HC RX 258: Performed by: ANESTHESIOLOGY

## 2024-08-20 RX ORDER — LIDOCAINE HYDROCHLORIDE 20 MG/ML
INJECTION, SOLUTION INFILTRATION; PERINEURAL PRN
Status: DISCONTINUED | OUTPATIENT
Start: 2024-08-20 | End: 2024-08-20 | Stop reason: SDUPTHER

## 2024-08-20 RX ORDER — PROPOFOL 10 MG/ML
INJECTION, EMULSION INTRAVENOUS CONTINUOUS PRN
Status: DISCONTINUED | OUTPATIENT
Start: 2024-08-20 | End: 2024-08-20 | Stop reason: SDUPTHER

## 2024-08-20 RX ORDER — SODIUM CHLORIDE, SODIUM LACTATE, POTASSIUM CHLORIDE, CALCIUM CHLORIDE 600; 310; 30; 20 MG/100ML; MG/100ML; MG/100ML; MG/100ML
INJECTION, SOLUTION INTRAVENOUS CONTINUOUS
Status: DISCONTINUED | OUTPATIENT
Start: 2024-08-20 | End: 2024-08-20 | Stop reason: HOSPADM

## 2024-08-20 RX ADMIN — SODIUM CHLORIDE, POTASSIUM CHLORIDE, SODIUM LACTATE AND CALCIUM CHLORIDE: 600; 310; 30; 20 INJECTION, SOLUTION INTRAVENOUS at 07:51

## 2024-08-20 RX ADMIN — PROPOFOL 200 MCG/KG/MIN: 10 INJECTION, EMULSION INTRAVENOUS at 09:10

## 2024-08-20 RX ADMIN — LIDOCAINE HYDROCHLORIDE 100 MG: 20 INJECTION, SOLUTION INFILTRATION; PERINEURAL at 09:13

## 2024-08-20 ASSESSMENT — PAIN - FUNCTIONAL ASSESSMENT: PAIN_FUNCTIONAL_ASSESSMENT: 0-10

## 2024-08-20 ASSESSMENT — PAIN SCALES - WONG BAKER: WONGBAKER_NUMERICALRESPONSE: NO HURT

## 2024-08-20 NOTE — DISCHARGE INSTRUCTIONS
ENDOSCOPY DISCHARGE INSTRUCTIONS:    Call the physician that did your procedure for any questions or concerns:           DR. ALEX:  [phone]               ACTIVITY:    There are potential side effects from the medications used for sedation and anesthesia during your procedure.  These include:  Dizziness or light-headedness, confusion or memory loss, delayed reaction times, loss of coordination, nausea and vomiting.  Because of your increased risk for injury, we ask that you observe the following precautions:  For the next 24 hours,  DO NOT operate an automobile, bicycle, motorcycle, , power tools or large equipment of any kind.  Do not drink alcohol, sign any legal documents or make any legal decisions for 24 hours.  Do not bend your head over lower than your heart.  DO sit on the side of bed/couch awhile before getting up.  Plan on bedrest or quiet relaxation today.  You may resume normal activities in 24 hours.    DIET:    Your first meal today should be light, avoiding spicy and fatty foods.  If you tolerate this first meal, then you may advance to your regular diet unless otherwise advised by your physician.    NORMAL SYMPTOMS:  -Mild sore throat if you’ve had an EGD   -Gaseous discomfort if you've had an EGD .     NOTIFY YOUR PHYSICIAN IF THESE SYMPTOMS OCCUR:  1. Fever (greater than 100)  5. Increased abdominal bloating  2. Severe pain    6. Excessive bleeding  3. Nausea and vomiting  7. Chest pain                                                                    4. Chills    8. Shortness of breath      ADDITIONAL INSTRUCTIONS:    Biopsy results: To be discussed at your follow-up visit.    Educational Information:    Follow up: Schedule an appointment with Dr. Alex for two weeks from now if you have not already done so.      Please review these discharge instructions this evening or tomorrow for  information you may have forgotten.            We want to thank you for choosing the [facility]

## 2024-08-20 NOTE — OP NOTE
[address] [address]                            OPERATIVE REPORT      PATIENT NAME: LAWANDA SANCHEZ               : 1955  MED REC NO: [id number]                      ROOM: Endo Pool  ACCOUNT NO: [id number]                       ADMIT DATE: 2024  PROVIDER: Estefany Faye MD      DATE OF PROCEDURE:  2024    SURGEON:  Estefany Faye MD    INDICATION FOR THE PROCEDURE:  69-year-old man who was found to have an angiodysplasia of the duodenum in earlier this year.  Due to the possibility of further angiodysplasias in the small intestine and due to the risk of bleeding, the patient is today having a push enteroscopy.  If angiodysplasia is found, an ablation will be performed.    DESCRIPTION OF PROCEDURE:  With the patient in the left lateral position after IV Diprivan, the Olympus video enteroscope was introduced into the esophagus and advanced toward stomach and further into the duodenum and all the way down into small intestine.  Careful inspection did not show anymore angiodysplasia in the small intestine.  The scope was then removed and procedure was terminated without complication.  The previously noted AVM in the duodenum has completely disappeared after the ablation that was performed in April of this year.    IMPRESSION:  Normal push enteroscopy.  No further angiodysplastic lesions were noted.    ESTIMATED BLOOD LOSS:  None.          ESTEFANY FAYE MD      D:  2024 09:34:03     T:  2024 09:53:11     PREETHI/LARS  Job #:  [id number]     Doc#:  [id number]

## 2024-08-20 NOTE — ANESTHESIA POSTPROCEDURE EVALUATION
Department of Anesthesiology  Postprocedure Note    Patient: Reji Burt  MRN: [id number]  YOB: 1955  Date of evaluation: 8/20/2024    Procedure Summary       Date: 08/20/24 Room / Location: [facility] ENDO  / [facility]    Anesthesia Start: 0906 Anesthesia Stop: 0930    Procedure: ENTEROSCOPY PUSH Diagnosis:       Acquired arteriovenous malformation of small intestine      (Acquired arteriovenous malformation of small intestine [K55.20])    Surgeons: Estefany Faye MD Responsible Provider: Oscar Mckeon MD    Anesthesia Type: MAC ASA Status: 3            Anesthesia Type: No value filed.    Joe Phase I: Joe Score: 10    Joe Phase II: Joe Score: 10    Anesthesia Post Evaluation    Patient location during evaluation: PACU  Patient participation: complete - patient participated  Level of consciousness: awake  Pain score: 0  Airway patency: patent  Nausea & Vomiting: no nausea  Cardiovascular status: hemodynamically stable  Respiratory status: acceptable  Hydration status: stable  Pain management: adequate    No notable events documented.

## 2024-08-20 NOTE — ANESTHESIA PRE PROCEDURE
Department of Anesthesiology  Preprocedure Note       Name:  Reji Burt   Age:  69 y.o.  :  1955                                          MRN:  [id number]         Date:  2024      Surgeon: Surgeon(s):  Estefany Faye MD    Procedure: Procedure(s):  ENTEROSCOPY PUSH    Medications prior to admission:   Prior to Admission medications    Medication Sig Start Date End Date Taking? Authorizing Provider   pantoprazole (PROTONIX) 40 MG tablet TAKE 1 TABLET BY MOUTH TWICE DAILY 22   Michael Taylor MD   Multiple Vitamin (MULTIVITAMIN) tablet 1 tablet 10/16/21   Michael Taylor MD   levothyroxine (SYNTHROID) 125 MCG tablet Take 1 tablet by mouth Daily KIRILL    Michael Taylor MD   SITagliptin-metFORMIN (JANUMET XR)  MG TB24 per extended release tablet Take 1 tablet by mouth 2 times daily.    Michael Taylor MD   ezetimibe-simvastatin (VYTORIN) 10-40 MG per tablet Take 1 tablet by mouth nightly    Michael Taylor MD   losartan-hydrochlorothiazide (HYZAAR) 100-25 MG per tablet Take 1 tablet by mouth daily    Michael Taylor MD       Current medications:    No current facility-administered medications for this encounter.       Allergies:    Allergies   Allergen Reactions   • Latex Rash     Any latex / rubber products   • Morphine Nausea And Vomiting       Problem List:  There is no problem list on file for this patient.      Past Medical History:        Diagnosis Date   • Branch esophagus    • Diabetes mellitus (HCC)     Type 2   • Esophageal lesion    • H/O removal of cyst     shin   • Hx of blood clots spring 2008    L ankle break; clots in leg and PE   • Hyperlipidemia    • Hypertension    • Pneumonia 3 WEEKS AGO     FINISHED ANTIBIOTICS ALREADY   • Thyroid disease        Past Surgical History:        Procedure Laterality Date   • COLONOSCOPY     • COLONOSCOPY N/A 2019    COLONOSCOPY performed by Estefany Faye MD at [facility] ENDOSCOPY   • COLONOSCOPY N/A

## 2024-08-20 NOTE — H&P
History and Physical / Pre-Sedation Assessment    Patient:  Reji Burt   :   1955     Intended Procedure:  enteroscopy    HPI: h/o of avm of duodenum. Had apc ablation.   Pos more avm's in sb     Past Medical History:   has a past medical history of Branch esophagus, Diabetes mellitus (HCC), Esophageal lesion, H/O removal of cyst, Hx of blood clots, Hyperlipidemia, Hypertension, Pneumonia, and Thyroid disease.     Past Surgical History:   has a past surgical history that includes Colonoscopy; Endoscopy, colon, diagnostic (13); fracture surgery (Left, spring 2006); Upper gastrointestinal endoscopy (N/A, 2018); Colonoscopy (N/A, 2019); Upper gastrointestinal endoscopy (N/A, 2019); Upper gastrointestinal endoscopy (N/A, 2020); Upper gastrointestinal endoscopy (N/A, 2021); Upper gastrointestinal endoscopy (N/A, 2021); Upper gastrointestinal endoscopy (N/A, 2021); Upper gastrointestinal endoscopy (N/A, 2021); Upper gastrointestinal endoscopy (N/A, 2021); Upper gastrointestinal endoscopy (N/A, 2021); Upper gastrointestinal endoscopy (N/A, 10/8/2021); Upper gastrointestinal endoscopy (N/A, 2021); Upper gastrointestinal endoscopy (N/A, 2022); Upper gastrointestinal endoscopy (N/A, 2022); Upper gastrointestinal endoscopy (N/A, 2022); Upper gastrointestinal endoscopy (N/A, 2022); Colonoscopy (N/A, 2022); Upper gastrointestinal endoscopy (N/A, 2023); Upper gastrointestinal endoscopy (N/A, 2024); and Upper gastrointestinal endoscopy (N/A, 2024).     Medications:  Prior to Admission medications    Medication Sig Start Date End Date Taking? Authorizing Provider   pantoprazole (PROTONIX) 40 MG tablet Take 1 tablet by mouth every other day 22  Yes Provider, MD Mihcael   Multiple Vitamin (MULTIVITAMIN) tablet 1 tablet 10/16/21  Yes Provider, Historical, MD   levothyroxine (SYNTHROID) 125 MCG tablet Take 1 tablet

## 2024-08-20 NOTE — PROGRESS NOTES
Ambulatory Surgery/Procedure Discharge Note    Vitals:    08/20/24 0940   BP: 106/66   Pulse: 61   Resp: 16   Temp: 97.4 °F (36.3 °C)   SpO2: 95%       In: 500 [I.V.:500]  Out: -     Restroom use offered before discharge.  Yes  Pt refuse fluids offered. Pt is toileted and up ad marc. Pt remains safe. Discharge instructions were read at bedside.     Pain assessment:  none  Pain Level: 0        Patient discharged to home/self care. Patient discharged and walk the patient to waiting family/S.O.       8/20/2024 10:06 AM

## (undated) DEVICE — FORCEPS BX L240CM JAW DIA2.4MM ORNG L CAP W/ NDL DISP RAD

## (undated) DEVICE — MASK O2 ADULT POM PROCEDURAL OXYGEN MASK 7FT O2 TUBING 10FT

## (undated) DEVICE — CATHETER ENDOSCP L135CM W7.5XL15.7MM DIA2.8MM FLX RFA

## (undated) DEVICE — FORCEPS BX L240CM DIA2.4MM L NDL RAD JAW 4 133340

## (undated) DEVICE — FORCEPS BX L240CM JAW DIA2.8MM L CAP W/ NDL MIC MESH TOOTH

## (undated) DEVICE — FIAPC® PROBE W/ FILTER 2200 A OD 2.3MM/6.9FR; L 2.2M/7.2FT: Brand: ERBE

## (undated) DEVICE — SNARE ENDOSCP L240CM DIA2.4MM LOOP W20MM RND INSUL STIFF

## (undated) DEVICE — MASK CAPNOGRAPHY AD W35IN DIA58IN SAMP LN L10FT O2 LN

## (undated) DEVICE — CANNULA SAMP CO2 AD GRN 7FT CO2 AND 7FT O2 TBNG UNIV CONN

## (undated) DEVICE — GUIDEWIRE ENDOSCP L230CM DIA0.038IN DEPTH MRK STR FLX DST